# Patient Record
Sex: MALE | Race: WHITE | Employment: OTHER | URBAN - METROPOLITAN AREA
[De-identification: names, ages, dates, MRNs, and addresses within clinical notes are randomized per-mention and may not be internally consistent; named-entity substitution may affect disease eponyms.]

---

## 2017-02-04 ENCOUNTER — APPOINTMENT (OUTPATIENT)
Dept: GENERAL RADIOLOGY | Age: 77
End: 2017-02-04
Attending: EMERGENCY MEDICINE
Payer: MEDICARE

## 2017-02-04 ENCOUNTER — HOSPITAL ENCOUNTER (OUTPATIENT)
Age: 77
Discharge: HOME | End: 2017-02-04
Attending: EMERGENCY MEDICINE
Payer: MEDICARE

## 2017-02-04 VITALS
SYSTOLIC BLOOD PRESSURE: 104 MMHG | HEIGHT: 74 IN | RESPIRATION RATE: 24 BRPM | OXYGEN SATURATION: 98 % | HEART RATE: 84 BPM | BODY MASS INDEX: 28.62 KG/M2 | TEMPERATURE: 98 F | DIASTOLIC BLOOD PRESSURE: 64 MMHG | WEIGHT: 223 LBS

## 2017-02-04 DIAGNOSIS — J40 BRONCHITIS: Primary | ICD-10-CM

## 2017-02-04 PROCEDURE — 99204 OFFICE O/P NEW MOD 45 MIN: CPT

## 2017-02-04 PROCEDURE — 99213 OFFICE O/P EST LOW 20 MIN: CPT

## 2017-02-04 PROCEDURE — 71020 XR CHEST PA + LAT CHEST (CPT=71020): CPT

## 2017-02-04 RX ORDER — AZITHROMYCIN 250 MG/1
TABLET, FILM COATED ORAL
Qty: 1 PACKAGE | Refills: 0 | Status: SHIPPED | OUTPATIENT
Start: 2017-02-04 | End: 2018-11-30 | Stop reason: ALTCHOICE

## 2017-02-04 NOTE — ED PROVIDER NOTES
Patient presents with:  Dyspnea GUICHO SOB (respiratory)      HPI:     Shai Ortiz is a 68year old male who presents for evaluation of a chief complaint of a cough. Onset of symptoms was 3 days ago.  The patient also complains of fever shortness of br flu like symptoms Pt c/o flu like symptoms. Pt completed  antibiotics for dental infection.      Order Specific Question: What is the Relevant Clinical Indication / Reason for Exam?  Answer: flu like symptoms  azithromycin (ZITHROMAX Z-KOKI) 250 MG Oral Tab

## 2017-12-12 PROBLEM — M17.11 PRIMARY OSTEOARTHRITIS OF RIGHT KNEE: Status: ACTIVE | Noted: 2017-12-12

## 2018-01-30 ENCOUNTER — HOSPITAL (OUTPATIENT)
Dept: OTHER | Age: 78
End: 2018-01-30
Attending: HOSPITALIST

## 2018-01-30 ENCOUNTER — DIAGNOSTIC TRANS (OUTPATIENT)
Dept: OTHER | Age: 78
End: 2018-01-30

## 2018-01-30 LAB
ANALYZER ANC (IANC): NORMAL
ANION GAP SERPL CALC-SCNC: 12 MMOL/L (ref 10–20)
BASOPHILS # BLD: 0 THOUSAND/MCL (ref 0–0.3)
BASOPHILS NFR BLD: 0 %
BUN SERPL-MCNC: 27 MG/DL (ref 6–20)
BUN/CREAT SERPL: 18 (ref 7–25)
CALCIUM SERPL-MCNC: 9.4 MG/DL (ref 8.4–10.2)
CHLORIDE: 104 MMOL/L (ref 98–107)
CO2 SERPL-SCNC: 28 MMOL/L (ref 21–32)
CREAT SERPL-MCNC: 1.52 MG/DL (ref 0.67–1.17)
D DIMER PPP FEU-MCNC: 0.52 MG/L FEU
DIFFERENTIAL METHOD BLD: NORMAL
EOSINOPHIL # BLD: 0.1 THOUSAND/MCL (ref 0.1–0.5)
EOSINOPHIL NFR BLD: 3 %
ERYTHROCYTE [DISTWIDTH] IN BLOOD: 13.3 % (ref 11–15)
GLUCOSE SERPL-MCNC: 108 MG/DL (ref 65–99)
HEMATOCRIT: 41.1 % (ref 39–51)
HGB BLD-MCNC: 14.2 GM/DL (ref 13–17)
LYMPHOCYTES # BLD: 1.2 THOUSAND/MCL (ref 1–4)
LYMPHOCYTES NFR BLD: 22 %
MCH RBC QN AUTO: 31.5 PG (ref 26–34)
MCHC RBC AUTO-ENTMCNC: 34.5 GM/DL (ref 32–36.5)
MCV RBC AUTO: 91.1 FL (ref 78–100)
MONOCYTES # BLD: 0.5 THOUSAND/MCL (ref 0.3–0.9)
MONOCYTES NFR BLD: 10 %
NEUTROPHILS # BLD: 3.4 THOUSAND/MCL (ref 1.8–7.7)
NEUTROPHILS NFR BLD: 65 %
NEUTS SEG NFR BLD: NORMAL %
PERCENT NRBC: NORMAL
PLATELET # BLD: 204 THOUSAND/MCL (ref 140–450)
POTASSIUM SERPL-SCNC: 3.5 MMOL/L (ref 3.4–5.1)
RBC # BLD: 4.51 MILLION/MCL (ref 4.5–5.9)
SODIUM SERPL-SCNC: 140 MMOL/L (ref 135–145)
TROPONIN I SERPL HS-MCNC: <0.02 NG/ML
TROPONIN I SERPL HS-MCNC: <0.02 NG/ML
WBC # BLD: 5.3 THOUSAND/MCL (ref 4.2–11)

## 2018-01-31 LAB
ANION GAP SERPL CALC-SCNC: 10 MMOL/L (ref 10–20)
BUN SERPL-MCNC: 26 MG/DL (ref 6–20)
BUN/CREAT SERPL: 17 (ref 7–25)
CALCIUM SERPL-MCNC: 9 MG/DL (ref 8.4–10.2)
CHLORIDE: 105 MMOL/L (ref 98–107)
CO2 SERPL-SCNC: 28 MMOL/L (ref 21–32)
CREAT SERPL-MCNC: 1.51 MG/DL (ref 0.67–1.17)
GLUCOSE SERPL-MCNC: 102 MG/DL (ref 65–99)
MAGNESIUM SERPL-MCNC: 2.1 MG/DL (ref 1.7–2.4)
POTASSIUM SERPL-SCNC: 4.1 MMOL/L (ref 3.4–5.1)
SODIUM SERPL-SCNC: 139 MMOL/L (ref 135–145)
TROPONIN I SERPL HS-MCNC: <0.02 NG/ML

## 2018-07-11 DIAGNOSIS — N20.9 URINARY TRACT STONES: ICD-10-CM

## 2018-07-11 DIAGNOSIS — E78.5 DYSLIPIDEMIA: ICD-10-CM

## 2018-07-11 DIAGNOSIS — D49.7 PITUITARY TUMOR: ICD-10-CM

## 2018-07-11 DIAGNOSIS — C61 PROSTATE CARCINOMA (HCC): ICD-10-CM

## 2018-07-11 DIAGNOSIS — I10 SYSTEMIC PRIMARY ARTERIAL HYPERTENSION: ICD-10-CM

## 2018-07-11 PROBLEM — I25.10 ATHEROSCLEROTIC CORONARY VASCULAR DISEASE: Status: ACTIVE | Noted: 2018-07-11

## 2018-07-11 PROBLEM — E03.9 HYPOTHYROIDISM: Status: ACTIVE | Noted: 2018-07-11

## 2018-07-11 PROBLEM — F41.9 ANXIETY DISORDER: Status: ACTIVE | Noted: 2018-07-11

## 2018-07-11 RX ORDER — M-VIT,TX,IRON,MINS/CALC/FOLIC 27MG-0.4MG
1 TABLET ORAL DAILY
COMMUNITY

## 2018-07-11 RX ORDER — FLUTICASONE FUROATE AND VILANTEROL 200; 25 UG/1; UG/1
1 POWDER RESPIRATORY (INHALATION) DAILY
COMMUNITY

## 2018-07-11 RX ORDER — LEVOTHYROXINE SODIUM 0.05 MG/1
88 TABLET ORAL
COMMUNITY
End: 2020-11-17 | Stop reason: DRUGHIGH

## 2018-07-11 RX ORDER — OMEPRAZOLE 20 MG/1
20 CAPSULE, DELAYED RELEASE ORAL DAILY
COMMUNITY

## 2018-07-11 RX ORDER — SIMVASTATIN 40 MG
40 TABLET ORAL NIGHTLY
COMMUNITY
End: 2018-12-11 | Stop reason: SDUPTHER

## 2018-07-11 RX ORDER — INDAPAMIDE 2.5 MG/1
2.5 TABLET, FILM COATED ORAL DAILY
COMMUNITY
End: 2018-07-19 | Stop reason: DRUGHIGH

## 2018-07-11 RX ORDER — LISINOPRIL 10 MG/1
10 TABLET ORAL DAILY
COMMUNITY
End: 2018-12-11 | Stop reason: SDUPTHER

## 2018-07-19 ENCOUNTER — OFFICE VISIT (OUTPATIENT)
Dept: CARDIOLOGY | Age: 78
End: 2018-07-19
Payer: MEDICARE

## 2018-07-19 VITALS
BODY MASS INDEX: 29.39 KG/M2 | DIASTOLIC BLOOD PRESSURE: 70 MMHG | HEIGHT: 74 IN | WEIGHT: 229 LBS | HEART RATE: 75 BPM | SYSTOLIC BLOOD PRESSURE: 110 MMHG

## 2018-07-19 DIAGNOSIS — R91.1 LUNG NODULE: ICD-10-CM

## 2018-07-19 DIAGNOSIS — I49.3 VENTRICULAR ECTOPY: ICD-10-CM

## 2018-07-19 DIAGNOSIS — Z98.890 HX OF CARDIAC CATH: ICD-10-CM

## 2018-07-19 DIAGNOSIS — I47.20 VENTRICULAR TACHYCARDIA: Primary | ICD-10-CM

## 2018-07-19 DIAGNOSIS — Z87.891 HISTORY OF TOBACCO ABUSE: ICD-10-CM

## 2018-07-19 PROCEDURE — 4040F PNEUMOC VAC/ADMIN/RCVD: CPT | Performed by: INTERNAL MEDICINE

## 2018-07-19 PROCEDURE — 1101F PT FALLS ASSESS-DOCD LE1/YR: CPT | Performed by: INTERNAL MEDICINE

## 2018-07-19 PROCEDURE — 99212 OFFICE O/P EST SF 10 MIN: CPT | Performed by: INTERNAL MEDICINE

## 2018-07-19 PROCEDURE — 1036F TOBACCO NON-USER: CPT | Performed by: INTERNAL MEDICINE

## 2018-07-19 PROCEDURE — G8599 NO ASA/ANTIPLAT THER USE RNG: HCPCS | Performed by: INTERNAL MEDICINE

## 2018-07-19 PROCEDURE — G8427 DOCREV CUR MEDS BY ELIG CLIN: HCPCS | Performed by: INTERNAL MEDICINE

## 2018-07-19 PROCEDURE — G8419 CALC BMI OUT NRM PARAM NOF/U: HCPCS | Performed by: INTERNAL MEDICINE

## 2018-07-19 PROCEDURE — 93000 ELECTROCARDIOGRAM COMPLETE: CPT | Performed by: INTERNAL MEDICINE

## 2018-07-19 PROCEDURE — 1123F ACP DISCUSS/DSCN MKR DOCD: CPT | Performed by: INTERNAL MEDICINE

## 2018-07-19 RX ORDER — INDAPAMIDE 1.25 MG/1
1.25 TABLET, FILM COATED ORAL EVERY MORNING
COMMUNITY
End: 2019-03-25 | Stop reason: ALTCHOICE

## 2018-07-19 ASSESSMENT — ENCOUNTER SYMPTOMS
BACK PAIN: 0
CHOKING: 0
NAUSEA: 0
SHORTNESS OF BREATH: 0
ABDOMINAL DISTENTION: 0
WHEEZING: 0
APNEA: 0
BLOOD IN STOOL: 0
CHEST TIGHTNESS: 0
STRIDOR: 0

## 2018-07-19 NOTE — LETTER
Dear Natalie Pugh MD,    Thank you for allowing me to participate in the care of Mr. Daniele Bridges. He presents today at the [address] [place] [address] in the [facility]. Mr. Jasen Vera returns today for the results of his Holter. It revealed dense ventricular ectopy (7.7% ventricular ectopy with 23 three-beat runs). He is asymptomatic as best I can discern. Prior 2010 cath - normal LVF and normal coronaries. No chest pain. Patient Active Problem List   Diagnosis    Dyslipidemia    Systemic primary arterial hypertension    Urinary tract stones    Anxiety disorder    Atherosclerotic coronary vascular disease    Prostate carcinoma ([facility] [address]    Pituitary tumor    Hypothyroidism    Hx of cardiac cath    History of tobacco abuse    Ventricular ectopy    Lung nodule         The significance of this sort of ventricular ectopy is for all practical purposes defined by the heart from which they derive. If his ventricular function has remained normal will likely make no changes in his regimen. Will obtain a resting ECHO.        Sincerely yours,    Sheilda Lombard, MD  [facility]

## 2018-07-19 NOTE — PATIENT INSTRUCTIONS
[place] at the [address] [place] [address] [address]    Date/Time:  August 22 arrive a few minutes before 10:00 A. M    Patient's contact number:  [phone] (home)     Echocardiogram -  No prep. Takes approximately 30 min. An echocardiogram uses sound waves to produce images of your heart. This commonly used test allows your doctor to see how your heart is beating and pumping blood. Your doctor can use the images from an echocardiogram to identify various abnormalities in the heart muscle and valves. This test has 2 parts:   Ø You will be asked to disrobe from the waist up and given a gown to wear. The technologist will then hook up an EKG monitor to you for the entire exam.   Ø You will then have an ultrasound of your heart (echocardiogram) to assess the heart muscle, heart valves and heart function. You may eat and take any medicines before the exam.     If you need to change your appointment, please call outpatient scheduling at [phone].

## 2018-07-19 NOTE — PROGRESS NOTES
, Rfl:     Multiple Vitamins-Minerals (THERAPEUTIC MULTIVITAMIN-MINERALS) tablet, Take 1 tablet by mouth daily, Disp: , Rfl:     Fluticasone Furoate-Vilanterol (BREO ELLIPTA) 200-25 MCG/INH AEPB, Inhale 1 puff into the lungs daily, Disp: , Rfl:     Review of Systems   Constitutional: Positive for fatigue. Negative for activity change, diaphoresis and unexpected weight change. Some difficulty with fatigue with this hot weather     HENT: Negative for congestion, dental problem, hearing loss and tinnitus. Eyes: Negative for visual disturbance. Respiratory: Negative for apnea, choking, chest tightness, shortness of breath, wheezing and stridor. Remote tobacco abuse  Lung nodule that has remained stable   Cardiovascular: Negative for chest pain, palpitations and leg swelling. Hypertension  2004 cath - mild plaquing described  2010 cath - normal LVF and no CAD  Holter - 7.7% ventricular ectopy with 23 3 beat runs (asymptomatic)   Gastrointestinal: Negative for abdominal distention, blood in stool and nausea. Endocrine: Negative for cold intolerance, heat intolerance, polydipsia and polyuria. Hypothyroidism  Pituitary Tumor - s/p surgery/radiation   Genitourinary: Negative for decreased urine volume and difficulty urinating. S/P seed implantation for prostate cancer  Nephrocalcinosis and ureteral stone   Musculoskeletal: Negative for arthralgias, back pain, gait problem and myalgias. Skin: Negative for pallor and rash. Allergic/Immunologic: Negative for environmental allergies, food allergies and immunocompromised state. Neurological: Negative for dizziness, tremors, seizures, syncope, speech difficulty, weakness, light-headedness, numbness and headaches. Pituitary tumor - prior surgery and radiation   Psychiatric/Behavioral: Negative for agitation, confusion, dysphoric mood and sleep disturbance. The patient is not nervous/anxious.         /70   Pulse 75 Ht 6' 2\" (1.88 m)   Wt 229 lb (103.9 kg)   BMI 29.40 kg/m²   Physical Exam   Constitutional: He is oriented to person, place, and time. He appears well-developed and well-nourished. No distress. HENT:   Head: Normocephalic. Normal male balding pattern   Eyes: Conjunctivae and EOM are normal. Pupils are equal, round, and reactive to light. Neck: No JVD present. Carotid bruit is not present. No thyromegaly present. Cardiovascular: Normal rate, regular rhythm, normal heart sounds, intact distal pulses and normal pulses. PMI is not displaced. Exam reveals no gallop and no friction rub. No murmur heard. Pulmonary/Chest: No respiratory distress. He has no wheezes. He has no rales. He exhibits no tenderness. Abdominal: Soft. Bowel sounds are normal. He exhibits no distension and no mass. There is no tenderness. There is no rebound and no guarding. Musculoskeletal: He exhibits no deformity. Neurological: He is alert and oriented to person, place, and time. Skin: Skin is warm. No rash noted. He is not diaphoretic. No erythema. No pallor. Psychiatric: He has a normal mood and affect. His behavior is normal. Judgment and thought content normal.   Vitals reviewed. Orders Placed This Encounter   Procedures    EKG 12 lead     Order Specific Question:   Reason for Exam?     Answer: Other     No orders of the defined types were placed in this encounter. ECG interpretation from today:   Normal sinus rhythm with low voltage in the limb leads and a 1st degree AVB. Assessment / Plan:  The significance of this sort of ventricular ectopy is for all practical purposes defined by the heart from which they derive. If his ventricular function has remained normal will likely make no changes in his regimen. Will obtain a resting ECHO.        Electronically sign by Willie Ashley MD 7/19/2018 2:46 PM

## 2018-08-13 ENCOUNTER — TELEPHONE (OUTPATIENT)
Dept: CARDIOLOGY | Age: 78
End: 2018-08-13

## 2018-08-13 NOTE — TELEPHONE ENCOUNTER
I left a message for the patient to call me back to reschedule the upcoming appointment with Dr. Ajay Sepulveda. He is pretty well booked until next year and he is okay at this time for his patients to be seen by the nurse practitioners or other available providers.

## 2018-08-22 ENCOUNTER — HOSPITAL ENCOUNTER (OUTPATIENT)
Dept: NON INVASIVE DIAGNOSTICS | Age: 78
Discharge: HOME | End: 2018-08-22
Payer: MEDICARE

## 2018-08-22 ENCOUNTER — OFFICE VISIT (OUTPATIENT)
Dept: CARDIOLOGY | Age: 78
End: 2018-08-22
Payer: MEDICARE

## 2018-08-22 VITALS
SYSTOLIC BLOOD PRESSURE: 112 MMHG | HEIGHT: 74 IN | BODY MASS INDEX: 30.54 KG/M2 | HEART RATE: 61 BPM | WEIGHT: 238 LBS | DIASTOLIC BLOOD PRESSURE: 60 MMHG

## 2018-08-22 DIAGNOSIS — R53.83 OTHER FATIGUE: ICD-10-CM

## 2018-08-22 DIAGNOSIS — I49.3 PVC (PREMATURE VENTRICULAR CONTRACTION): ICD-10-CM

## 2018-08-22 DIAGNOSIS — I49.3 VENTRICULAR ECTOPY: ICD-10-CM

## 2018-08-22 DIAGNOSIS — I47.20 VENTRICULAR TACHYCARDIA: ICD-10-CM

## 2018-08-22 DIAGNOSIS — R06.09 DOE (DYSPNEA ON EXERTION): Primary | ICD-10-CM

## 2018-08-22 DIAGNOSIS — Z87.891 HISTORY OF TOBACCO ABUSE: ICD-10-CM

## 2018-08-22 DIAGNOSIS — Z98.890 HX OF CARDIAC CATH: ICD-10-CM

## 2018-08-22 DIAGNOSIS — I10 SYSTEMIC PRIMARY ARTERIAL HYPERTENSION: ICD-10-CM

## 2018-08-22 LAB
LV EF: 58 %
LVEF MODALITY: NORMAL

## 2018-08-22 PROCEDURE — 99213 OFFICE O/P EST LOW 20 MIN: CPT | Performed by: NURSE PRACTITIONER

## 2018-08-22 PROCEDURE — 1036F TOBACCO NON-USER: CPT | Performed by: NURSE PRACTITIONER

## 2018-08-22 PROCEDURE — 1101F PT FALLS ASSESS-DOCD LE1/YR: CPT | Performed by: NURSE PRACTITIONER

## 2018-08-22 PROCEDURE — G8599 NO ASA/ANTIPLAT THER USE RNG: HCPCS | Performed by: NURSE PRACTITIONER

## 2018-08-22 PROCEDURE — 93000 ELECTROCARDIOGRAM COMPLETE: CPT | Performed by: NURSE PRACTITIONER

## 2018-08-22 PROCEDURE — 93306 TTE W/DOPPLER COMPLETE: CPT

## 2018-08-22 PROCEDURE — 4040F PNEUMOC VAC/ADMIN/RCVD: CPT | Performed by: NURSE PRACTITIONER

## 2018-08-22 PROCEDURE — 1123F ACP DISCUSS/DSCN MKR DOCD: CPT | Performed by: NURSE PRACTITIONER

## 2018-08-22 PROCEDURE — G8417 CALC BMI ABV UP PARAM F/U: HCPCS | Performed by: NURSE PRACTITIONER

## 2018-08-22 PROCEDURE — G8427 DOCREV CUR MEDS BY ELIG CLIN: HCPCS | Performed by: NURSE PRACTITIONER

## 2018-08-22 NOTE — PATIENT INSTRUCTIONS
Patient Education        Stress Echocardiogram: About This Test  What is it? An echocardiogram (also called an echo) uses sound waves to make an image of your heart. A device called a transducer is moved across your chest. It looks like a microphone. The transducer sends sound waves that echo off your heart and back to the transducer. These echoes are turned into moving pictures of your heart that can be seen on a video screen. In a stress echocardiogram, an echo is done while your heart is at rest and after your heart is made to work hard (stressed). You exercise to make your heart work hard. Sometimes, instead of exercise, a medicine is used that makes your heart respond like you have been exercising. Why is this test done? A stress echocardiogram is usually done to find out if you might have reduced blood flow to your heart. This is known as coronary artery disease. Reduced blood flow is easier to see when your heart is put under some form of stress. How can you prepare for the test?  · Do not smoke or eat a heavy meal before this test.  · Wear flat, comfortable shoes (no bedroom slippers) and loose, lightweight shorts or sweatpants. Walking or running shoes are best.  · Tell your doctor if:  Lm Vaughn You are taking any medicines. ¨ You are taking medicine for an erection problem, such as sildenafil (Viagra), tadalafil (Cialis), and vardenafil (Levitra). You may need to take nitroglycerin during this test, which can cause a serious reaction if you have taken a medicine for an erection problem within the past 48 hours. ¨ You have had bleeding problems, or if you take aspirin or some other blood thinner. ¨ You have joint problems in your hips or legs that may make it hard for you to exercise. ¨ You have a heart valve problem. What happens during the test?  You will first have an echocardiogram before exercising. This is called the baseline.  You then exercise for a specific amount of time and then have another echocardiogram.  · You will take off all or most of your clothes and change into a gown. · You will lie on your back or on your left side on a bed or table. · You may receive medicine through a vein (intravenously, or IV). The IV can be used to give you a contrast material, which helps your doctor get good views of your heart. · Small pads or patches (electrodes) will be taped to your arms and legs to record your heart rate during the test.  · A small amount of gel will be rubbed on the left side of your chest to help  the sound waves. · The transducer will be pressed firmly against your chest and moved slowly back and forth. It is usually moved to different areas on your chest to get specific views of your heart. · You will be asked to do several things, such as hold very still, breathe in and out very slowly, hold your breath, or lie on your left side. This test usually takes 30 to 60 minutes. When the echocardiogram is finished, you will exercise and then have another echocardiogram. If you are not able to exercise, you may be given medicine that stimulates your heart to beat harder and faster, as if you were exercising. You most likely will either walk on a treadmill or pedal a stationary bicycle. While you exercise:  · Your heart rate and blood pressure are recorded. · You might be asked to use numbers to say how hard you are exercising. The higher the number, the harder you think you are exercising. · You will continue to exercise until you or your doctor feels you need to stop. · You will then lie on a bed or table, and another echocardiogram will be done. An exercise stress echocardiogram takes about 30 to 60 minutes. What else should you know about the test?  · No electricity passes through your body during the test. There is no danger of getting an electrical shock. · During the tests, tell your doctor if:  ¨ You have chest pain. ¨ You are very short of breath.   ¨ You are lightheaded. ¨ You have other symptoms. What happens after the test?  · You will be able to sit or lie down and rest.  · Your heart rate and blood pressure will be checked for about 5 to 10 minutes. · You can go back to your usual activities right away. When should you call for help? Call 911 anytime you think you may need emergency care. For example, call if:  · You passed out (lost consciousness). · You have symptoms of a heart attack, such as:  ¨ Chest pain or pressure. ¨ Sweating. ¨ Shortness of breath. ¨ Nausea or vomiting. ¨ Pain that spreads from the chest to the neck, jaw, or one or both shoulders or arms. ¨ Dizziness or lightheadedness. ¨ A fast or uneven pulse. After calling 911, chew 1 adult-strength aspirin. Wait for an ambulance. Do not try to drive yourself. Watch closely for changes in your health, and be sure to contact your doctor if:  · You do not get better as expected. Follow-up care is a key part of your treatment and safety. Be sure to make and go to all appointments, and call your doctor if you are having problems. It's also a good idea to keep a list of the medicines you take. Ask your doctor when you can expect to have your test results. Where can you learn more? Go to https://TipCity.M[employer] InDeed. org and sign in to your Quu account. Enter I172 in the [facility] box to learn more about \"Stress Echocardiogram: About This Test.\"     If you do not have an account, please click on the \"Sign Up Now\" link. Current as of: December 6, 2017  Content Version: 11.7  © 3522-6064 [employer]. Care instructions adapted under license by [facility] ([facility]). If you have questions about a medical condition or this instruction, always ask your healthcare professional. [address] any warranty or liability for your use of this information.      Hypertension  (High Blood Pressure)  Most people with high blood pressure (hypertension) have no doctor as told. GET HELP RIGHT AWAY IF:  · You get a very bad headache. · You get blurred or changing vision. · You feel confused. · You feel weak, numb, or faint. · You get chest or belly (abdominal) pain. · You throw up (vomit). · You cannot breathe very well. If you have a blood pressure reading with a top number of 180 or higher, you need to see your doctor right away. This is especially true if you are having any of the problems listed above. Hyperlipidemia   (Dyslipidemia; High Triglycerides; Triglycerides, High)     Definition   Hyperlipidemia is a high level of fats in the blood. These fats, called lipids, include cholesterol and triglycerides. There are five types of hyperlipidemia. The type depends on which lipid in the blood is high. Causes   Causes may include:   A family history of hyperlipidemia   A diet high in total fat, saturated fat, or cholesterol   Obesity   Excess alcohol intake   Certain conditions, including:   Diabetes   Low thyroid   Kidney problems   Liver disease   Cushing's syndrome   Certain drugs, such as:   Hormones or birth control pills   Beta-blockers   Some diuretics   Cortisone drugs   Isotretinoin (for acne )   Some anti- HIV drugs   Risk Factors   These factors increase your chance of developing this condition. Tell your doctor if you have any of these:   Advancing age   Sex: male   Postmenopause   Lack of exercise   Smoking   Stress   Overuse of alcohol   Symptoms   Hyperlipidemia usually does not cause symptoms. Very high levels of lipids or triglycerides can cause:   Fat deposits in the skin or tendons ( xanthomas )   Pain, enlargement, or swelling of organs such as the liver, spleen, or pancreas ( pancreatitis )   Obstruction of blood vessels in heart and brain   Hyperlipidemia can increase your risk of atherosclerosis . This is a dangerous hardening of the arteries. It can end up blocking blood flow.  In some cases, this may result in:   Angina changes. Prevention   To help reduce your chance of getting hyperlipidemia, take the following steps:   Starting at age 21, get cholesterol tests. Eat a diet low in total fat, saturated fat, and cholesterol. If you smoke, quit. Drink alcohol in moderation (two drinks per day for men, one drink per day for women). If you are overweight, lose weight. Exercise regularly. Talk with your doctor first.   If you have diabetes , control your blood sugar. Talk to your doctor about medications you are taking. They may have side effects that cause hyperlipidemia. Last Reviewed: September 2010 Skip Bonilla DO   Updated: 11/9/2010     [place] at the [facility] and [address] [facility] located on the first floor of [place] [address] through hospital main entrance and turn immediately to your left. Patient contact number:  [phone] (home)      Date/Arrival Time:  Sep 6th arrive 9:15 for 9:45A. M    Stress Echocardiogram      This records the heart's activity during a cardiac stress test.  A stress echocardiogram is a very effective, noninvasive test that can help determine whether you have blockages in your coronary arteries. The exam takes approximately thirty minutes. To help ensure accurate results, patients should take the following steps in preparation for a stress echocardiogram:   Refrain from strenuous activity for 12 hours before the test.   Do not eat, drink, or smoke for two hours prior to the test.  Unless instructed otherwise by your physician, you should continue to take prescribed medications. Wear loose, comfortable clothing and walking shoes. If you need to change this appointment, please call [phone] to reschedule.

## 2018-08-23 NOTE — PROGRESS NOTES
EXCISION         Family History   Problem Relation Age of Onset    Hypertension Mother        Social History     Social History    Marital status:      Spouse name: N/A    Number of children: N/A    Years of education: N/A     Occupational History    Not on file. Social History Main Topics    Smoking status: Former Smoker    Smokeless tobacco: Never Used      Comment: Quit over 50 years ago    Alcohol use Yes    Drug use: No    Sexual activity: Not on file     Other Topics Concern    Not on file     Social History Narrative    No narrative on file       No Known Allergies      Current Outpatient Prescriptions:     indapamide (LOZOL) 1.25 MG tablet, Take 1.25 mg by mouth every morning, Disp: , Rfl:     sertraline (ZOLOFT) 50 MG tablet, Take 50 mg by mouth daily, Disp: , Rfl:     simvastatin (ZOCOR) 40 MG tablet, Take 40 mg by mouth nightly, Disp: , Rfl:     omeprazole (PRILOSEC) 20 MG delayed release capsule, Take 20 mg by mouth daily , Disp: , Rfl:     lisinopril (PRINIVIL;ZESTRIL) 10 MG tablet, Take 10 mg by mouth daily, Disp: , Rfl:     levothyroxine (SYNTHROID) 50 MCG tablet, Take 50 mcg by mouth Daily, Disp: , Rfl:     Multiple Vitamins-Minerals (THERAPEUTIC MULTIVITAMIN-MINERALS) tablet, Take 1 tablet by mouth daily, Disp: , Rfl:     Fluticasone Furoate-Vilanterol (BREO ELLIPTA) 200-25 MCG/INH AEPB, Inhale 1 puff into the lungs daily, Disp: , Rfl:     PE:  Vitals:    08/22/18 1059   BP: 112/60   Pulse: 61       Estimated body mass index is 30.56 kg/m² as calculated from the following:    Height as of this encounter: 6' 2\" (1.88 m). Weight as of this encounter: 238 lb (108 kg). Constitutional: He is oriented to person, place, and time. He appears well-developed and well-nourished in no acute distress. Head: Normocephalic and atraumatic. Neck:  Neck supple without JVD present. Cardiovascular: Normal rate, Normal rhythm, normal heart sounds. No murmur ascultated.   No gallop and no friction rub. No carotid bruits. No peripheral edema. Pulmonary/Chest:  Lungs clear to auscultation bilaterally without evidence of respiratory distress. He without wheezes. He without rales or ronchi. Musculoskeletal: Normal range of motion. Gait is normal.   Neurological: He is alert and oriented to person, place, and time. Skin: Skin is warm and dry without rash or pallor. Psychiatric: He has a normal mood and affect. His behavior is normal. Thought content normal.     No results found for: CREATININE, HGB, PROBNP    ECG 08/23/18  Sinus Rhythm, First degree AV block, Occasional PAC, HR 60    TTE- 8/22/18  Summary   Normal left ventricular size and function EF 55-60%. Mild left ventricular   hypertrophy with normal diastolic function evidenced by E/A ratio. Left   atrium is normal size. Aortic valve probably tricuspid (poorly visualized)   - functions normally. Mitral valve appears structurally normal with trace   MR. Right sided chambers appear normal size and RV systolic function is   preserved. There is no pericardial effusion and the aortic root dimensions   are within normal limits. Assessment, Recommendations, & Plan:  66 y.o. male with HTN, HLD, PVC's, & RAMIRES    HTN- Controlled, No changes    HLD- On Zocor, No changes    PVC's- Echo was essentialyl normal. I spoke with Dr. Sissy Levi and he would like to get a Stress echo to rule out any ischemia. . We will call him with results. RAMIRES- Stress echo      Disposition - RTC in 6 months with Dr. Sissy Levi or sooner if needed      Please do not hesitate to contact me for any questions or concerns.     Sincerely yours,    SADI Oliveira

## 2018-09-06 ENCOUNTER — HOSPITAL ENCOUNTER (OUTPATIENT)
Dept: NON INVASIVE DIAGNOSTICS | Age: 78
Discharge: HOME | End: 2018-09-06
Payer: MEDICARE

## 2018-09-06 DIAGNOSIS — R06.09 DOE (DYSPNEA ON EXERTION): ICD-10-CM

## 2018-09-06 DIAGNOSIS — I49.3 PVC (PREMATURE VENTRICULAR CONTRACTION): ICD-10-CM

## 2018-09-06 DIAGNOSIS — R53.83 OTHER FATIGUE: ICD-10-CM

## 2018-09-06 LAB
LV EF: 50 %
LVEF MODALITY: NORMAL

## 2018-09-06 PROCEDURE — 6360000004 HC RX CONTRAST MEDICATION: Performed by: INTERNAL MEDICINE

## 2018-09-06 PROCEDURE — C8928 TTE W OR W/O FOL W/CON,STRES: HCPCS

## 2018-09-06 PROCEDURE — 2580000003 HC RX 258: Performed by: INTERNAL MEDICINE

## 2018-09-06 RX ORDER — SODIUM CHLORIDE 0.9 % (FLUSH) 0.9 %
10 SYRINGE (ML) INJECTION PRN
Status: ACTIVE | OUTPATIENT
Start: 2018-09-06 | End: 2018-09-07

## 2018-09-06 RX ADMIN — Medication 10 ML: at 10:55

## 2018-09-06 RX ADMIN — PERFLUTREN 2.2 MG: 6.52 INJECTION, SUSPENSION INTRAVENOUS at 10:58

## 2018-11-07 ENCOUNTER — APPOINTMENT (OUTPATIENT)
Dept: GENERAL RADIOLOGY | Age: 78
End: 2018-11-07
Payer: MEDICARE

## 2018-11-07 ENCOUNTER — APPOINTMENT (OUTPATIENT)
Dept: CT IMAGING | Age: 78
End: 2018-11-07
Payer: MEDICARE

## 2018-11-07 ENCOUNTER — HOSPITAL ENCOUNTER (EMERGENCY)
Age: 78
Discharge: HOME | End: 2018-11-07
Attending: EMERGENCY MEDICINE
Payer: MEDICARE

## 2018-11-07 VITALS
DIASTOLIC BLOOD PRESSURE: 71 MMHG | BODY MASS INDEX: 27.72 KG/M2 | RESPIRATION RATE: 18 BRPM | OXYGEN SATURATION: 93 % | HEIGHT: 74 IN | TEMPERATURE: 98.2 F | HEART RATE: 61 BPM | WEIGHT: 216 LBS | SYSTOLIC BLOOD PRESSURE: 124 MMHG

## 2018-11-07 DIAGNOSIS — R42 DIZZINESS: Primary | ICD-10-CM

## 2018-11-07 DIAGNOSIS — R53.1 GENERAL WEAKNESS: ICD-10-CM

## 2018-11-07 DIAGNOSIS — R06.02 SHORTNESS OF BREATH: ICD-10-CM

## 2018-11-07 DIAGNOSIS — M25.50 ARTHRALGIA, UNSPECIFIED JOINT: ICD-10-CM

## 2018-11-07 DIAGNOSIS — R53.81 MALAISE AND FATIGUE: ICD-10-CM

## 2018-11-07 DIAGNOSIS — R53.83 MALAISE AND FATIGUE: ICD-10-CM

## 2018-11-07 LAB
ALBUMIN SERPL-MCNC: 4 G/DL (ref 3.5–5.2)
ALP BLD-CCNC: 47 U/L (ref 40–130)
ALT SERPL-CCNC: 17 U/L (ref 5–41)
ANION GAP SERPL CALCULATED.3IONS-SCNC: 14 MMOL/L (ref 7–19)
APTT: 35.6 SEC (ref 26–36.2)
AST SERPL-CCNC: 24 U/L (ref 5–40)
BASE EXCESS ARTERIAL: 2.8 MMOL/L (ref -2–2)
BASOPHILS ABSOLUTE: 0.1 K/UL (ref 0–0.2)
BASOPHILS RELATIVE PERCENT: 0.8 % (ref 0–1)
BILIRUB SERPL-MCNC: 0.5 MG/DL (ref 0.2–1.2)
BILIRUBIN URINE: NEGATIVE
BLOOD, URINE: NEGATIVE
BUN BLDV-MCNC: 20 MG/DL (ref 8–23)
CALCIUM SERPL-MCNC: 9.3 MG/DL (ref 8.8–10.2)
CARBOXYHEMOGLOBIN ARTERIAL: 2.1 % (ref 0–5)
CHLORIDE BLD-SCNC: 98 MMOL/L (ref 98–111)
CLARITY: CLEAR
CO2: 25 MMOL/L (ref 22–29)
COLOR: YELLOW
CREAT SERPL-MCNC: 1.6 MG/DL (ref 0.5–1.2)
D DIMER: 0.57 UG/ML FEU (ref 0–0.48)
EOSINOPHILS ABSOLUTE: 0.1 K/UL (ref 0–0.6)
EOSINOPHILS RELATIVE PERCENT: 1.8 % (ref 0–5)
GFR NON-AFRICAN AMERICAN: 42
GLUCOSE BLD-MCNC: 100 MG/DL (ref 74–109)
GLUCOSE URINE: NEGATIVE MG/DL
HCO3 ARTERIAL: 23.4 MMOL/L (ref 22–26)
HCT VFR BLD CALC: 38 % (ref 42–52)
HEMOGLOBIN, ART, EXTENDED: 13.2 G/DL (ref 14–18)
HEMOGLOBIN: 12.8 G/DL (ref 14–18)
INR BLD: 1.1 (ref 0.88–1.18)
KETONES, URINE: NEGATIVE MG/DL
LEUKOCYTE ESTERASE, URINE: NEGATIVE
LIPASE: 39 U/L (ref 13–60)
LYMPHOCYTES ABSOLUTE: 1.5 K/UL (ref 1.1–4.5)
LYMPHOCYTES RELATIVE PERCENT: 24.8 % (ref 20–40)
MAGNESIUM: 2.1 MG/DL (ref 1.6–2.4)
MCH RBC QN AUTO: 31.1 PG (ref 27–31)
MCHC RBC AUTO-ENTMCNC: 33.7 G/DL (ref 33–37)
MCV RBC AUTO: 92.5 FL (ref 80–94)
METHEMOGLOBIN ARTERIAL: 1 %
MONOCYTES ABSOLUTE: 0.6 K/UL (ref 0–0.9)
MONOCYTES RELATIVE PERCENT: 9.9 % (ref 0–10)
NEUTROPHILS ABSOLUTE: 3.8 K/UL (ref 1.5–7.5)
NEUTROPHILS RELATIVE PERCENT: 62.5 % (ref 50–65)
NITRITE, URINE: NEGATIVE
O2 CONTENT ARTERIAL: 17.6 ML/DL
O2 SAT, ARTERIAL: 94.8 %
O2 THERAPY: ABNORMAL
PCO2 ARTERIAL: 25 MMHG (ref 35–45)
PDW BLD-RTO: 13.2 % (ref 11.5–14.5)
PH ARTERIAL: 7.58 (ref 7.35–7.45)
PH UA: 6
PLATELET # BLD: 215 K/UL (ref 130–400)
PMV BLD AUTO: 9.1 FL (ref 9.4–12.4)
PO2 ARTERIAL: 72 MMHG (ref 80–100)
POTASSIUM REFLEX MAGNESIUM: 3.3 MMOL/L (ref 3.5–5)
POTASSIUM, WHOLE BLOOD: 3.2
PROTEIN UA: NEGATIVE MG/DL
PROTHROMBIN TIME: 14.1 SEC (ref 12–14.6)
RBC # BLD: 4.11 M/UL (ref 4.7–6.1)
SODIUM BLD-SCNC: 137 MMOL/L (ref 136–145)
SPECIFIC GRAVITY UA: 1.02
TOTAL PROTEIN: 6.4 G/DL (ref 6.6–8.7)
TROPONIN: <0.01 NG/ML (ref 0–0.03)
TSH REFLEX FT4: 1.23 UIU/ML (ref 0.35–5.5)
URINE REFLEX TO CULTURE: NORMAL
UROBILINOGEN, URINE: 0.2 E.U./DL
WBC # BLD: 6.1 K/UL (ref 4.8–10.8)

## 2018-11-07 PROCEDURE — 85379 FIBRIN DEGRADATION QUANT: CPT

## 2018-11-07 PROCEDURE — 81003 URINALYSIS AUTO W/O SCOPE: CPT

## 2018-11-07 PROCEDURE — 84443 ASSAY THYROID STIM HORMONE: CPT

## 2018-11-07 PROCEDURE — 99285 EMERGENCY DEPT VISIT HI MDM: CPT

## 2018-11-07 PROCEDURE — 6360000004 HC RX CONTRAST MEDICATION: Performed by: EMERGENCY MEDICINE

## 2018-11-07 PROCEDURE — 93005 ELECTROCARDIOGRAM TRACING: CPT

## 2018-11-07 PROCEDURE — 71045 X-RAY EXAM CHEST 1 VIEW: CPT

## 2018-11-07 PROCEDURE — 83690 ASSAY OF LIPASE: CPT

## 2018-11-07 PROCEDURE — 36415 COLL VENOUS BLD VENIPUNCTURE: CPT

## 2018-11-07 PROCEDURE — 84484 ASSAY OF TROPONIN QUANT: CPT

## 2018-11-07 PROCEDURE — 2580000003 HC RX 258: Performed by: EMERGENCY MEDICINE

## 2018-11-07 PROCEDURE — 96365 THER/PROPH/DIAG IV INF INIT: CPT

## 2018-11-07 PROCEDURE — 6360000002 HC RX W HCPCS: Performed by: EMERGENCY MEDICINE

## 2018-11-07 PROCEDURE — 71275 CT ANGIOGRAPHY CHEST: CPT

## 2018-11-07 PROCEDURE — 85730 THROMBOPLASTIN TIME PARTIAL: CPT

## 2018-11-07 PROCEDURE — 99284 EMERGENCY DEPT VISIT MOD MDM: CPT | Performed by: EMERGENCY MEDICINE

## 2018-11-07 PROCEDURE — 83735 ASSAY OF MAGNESIUM: CPT

## 2018-11-07 PROCEDURE — 82803 BLOOD GASES ANY COMBINATION: CPT

## 2018-11-07 PROCEDURE — 36600 WITHDRAWAL OF ARTERIAL BLOOD: CPT

## 2018-11-07 PROCEDURE — 85610 PROTHROMBIN TIME: CPT

## 2018-11-07 PROCEDURE — 80053 COMPREHEN METABOLIC PANEL: CPT

## 2018-11-07 PROCEDURE — 96375 TX/PRO/DX INJ NEW DRUG ADDON: CPT

## 2018-11-07 PROCEDURE — 85025 COMPLETE CBC W/AUTO DIFF WBC: CPT

## 2018-11-07 PROCEDURE — 84132 ASSAY OF SERUM POTASSIUM: CPT

## 2018-11-07 RX ORDER — 0.9 % SODIUM CHLORIDE 0.9 %
1000 INTRAVENOUS SOLUTION INTRAVENOUS ONCE
Status: COMPLETED | OUTPATIENT
Start: 2018-11-07 | End: 2018-11-07

## 2018-11-07 RX ORDER — MAGNESIUM SULFATE IN WATER 40 MG/ML
2 INJECTION, SOLUTION INTRAVENOUS ONCE
Status: COMPLETED | OUTPATIENT
Start: 2018-11-07 | End: 2018-11-07

## 2018-11-07 RX ORDER — KETOROLAC TROMETHAMINE 30 MG/ML
15 INJECTION, SOLUTION INTRAMUSCULAR; INTRAVENOUS ONCE
Status: COMPLETED | OUTPATIENT
Start: 2018-11-07 | End: 2018-11-07

## 2018-11-07 RX ADMIN — MAGNESIUM SULFATE HEPTAHYDRATE 2 G: 40 INJECTION, SOLUTION INTRAVENOUS at 20:02

## 2018-11-07 RX ADMIN — SODIUM CHLORIDE 1000 ML: 9 INJECTION, SOLUTION INTRAVENOUS at 20:58

## 2018-11-07 RX ADMIN — SODIUM CHLORIDE 1000 ML: 9 INJECTION, SOLUTION INTRAVENOUS at 19:08

## 2018-11-07 RX ADMIN — IOPAMIDOL 90 ML: 755 INJECTION, SOLUTION INTRAVENOUS at 20:32

## 2018-11-07 RX ADMIN — KETOROLAC TROMETHAMINE 15 MG: 30 INJECTION, SOLUTION INTRAMUSCULAR at 19:08

## 2018-11-07 ASSESSMENT — ENCOUNTER SYMPTOMS
BLOOD IN STOOL: 0
CONSTIPATION: 0
CHEST TIGHTNESS: 0
SORE THROAT: 0
NAUSEA: 0
COUGH: 0
ABDOMINAL PAIN: 0
TROUBLE SWALLOWING: 0
DIARRHEA: 0
ABDOMINAL DISTENTION: 0
SHORTNESS OF BREATH: 1
VOMITING: 0
RHINORRHEA: 0
BACK PAIN: 0

## 2018-11-07 ASSESSMENT — PAIN DESCRIPTION - LOCATION: LOCATION: GENERALIZED

## 2018-11-07 ASSESSMENT — PAIN SCALES - GENERAL
PAINLEVEL_OUTOF10: 6
PAINLEVEL_OUTOF10: 6

## 2018-11-07 ASSESSMENT — PAIN DESCRIPTION - PAIN TYPE: TYPE: ACUTE PAIN;CHRONIC PAIN

## 2018-11-08 LAB
EKG P AXIS: NORMAL DEGREES
EKG P-R INTERVAL: NORMAL MS
EKG Q-T INTERVAL: 442 MS
EKG QRS DURATION: 96 MS
EKG QTC CALCULATION (BAZETT): 449 MS
EKG T AXIS: 44 DEGREES

## 2018-11-08 NOTE — ED PROVIDER NOTES
[facility]  eMERGENCY dEPARTMENT eNCOUnter      Pt Name: Nancy Graves  MRN: [id number]  [place] 1940  Date of evaluation: 11/7/2018  Provider: Martha Segura MD    CHIEF COMPLAINT       Chief Complaint   Patient presents with    Shortness of Breath    Fatigue    Dizziness     with position changes    Other     pt has multiple complaints that are intermittent for months. Pt states \"everyday its something else\"       HISTORY OF PRESENT ILLNESS   (Location/Symptom, Timing/Onset, Context/Setting, Quality, Duration, Modifying Factors, Severity)  Note limiting factors. Nancy Graves is a 66 y.o. male who presents to the emergency department with Multiple complaints. Does have been going on for the last few months minimum. She complains of arthralgias all over his body, dyspnea on exertion, lightheadedness with position change. The dyspnea on exertion does not always happen when he exerts himself but sometimes does. Nothing makes the bodyaches better or worse there constant and, what time. The lightheadedness seems to be more with position change, but is not always present with position changes well. Patient has seen his primary care physician, also seen his pulmonologist. He sees his urologist tomorrow. He is here because he wants to know why he is fatigued, having difficult to breathing, having body aches, and poor appetite. No other complaints at this point in time. Again these have been going on for the past few months, prior evaluations by above-mentioned physicians, workup thus far has not found any acute pathology. Patient appears very anxious. Nursing Notes were reviewed. REVIEW OF SYSTEMS    (2-9 systems for level 4,10 or more for level 5)     Review of Systems   Constitutional: Positive for appetite change and fatigue. Negative for activity change, chills and fever. HENT: Negative for congestion, ear pain, nosebleeds, rhinorrhea, sore throat and trouble swallowing. Social History    Marital status:      Spouse name: N/A    Number of children: N/A    Years of education: N/A     Social History Main Topics    Smoking status: Former Smoker    Smokeless tobacco: Never Used      Comment: Quit over 50 years ago    Alcohol use Yes    Drug use: No    Sexual activity: Not on file     Other Topics Concern    Not on file     Social History Narrative    No narrative on file       SCREENINGS    Judy Coma Scale  Eye Opening: Spontaneous  Best Verbal Response: Oriented  Best Motor Response: Obeys commands  Judy Coma Scale Score: 15      PHYSICAL EXAM    (up to 7 for level 4, 8 or more for level 5)     ED Triage Vitals [11/07/18 1823]   BP Temp Temp Source Pulse Resp SpO2 Height Weight   (!) 147/81 97.4 °F (36.3 °C) Oral 80 18 94 % 6' 2\" (1.88 m) 216 lb (98 kg)       Physical Exam   Constitutional: He is oriented to person, place, and time. He appears well-developed and well-nourished. No distress. HENT:   Head: Normocephalic and atraumatic. Mouth/Throat: Oropharynx is clear and moist.   Eyes: Pupils are equal, round, and reactive to light. EOM are normal. No scleral icterus. Neck: Normal range of motion. Neck supple. No tracheal deviation present. Cardiovascular: Normal rate, regular rhythm, normal heart sounds and intact distal pulses. Exam reveals no gallop and no friction rub. No murmur heard. Pulmonary/Chest: Effort normal and breath sounds normal. No respiratory distress. He has no wheezes. He has no rales. He exhibits no tenderness. Abdominal: Soft. He exhibits no distension and no mass. There is no tenderness. There is no rebound and no guarding. Genitourinary: Rectum normal and penis normal. Rectal exam shows guaiac negative stool. Musculoskeletal: Normal range of motion. He exhibits no edema, tenderness or deformity. Neurological: He is alert and oriented to person, place, and time. No cranial nerve deficit.  He exhibits normal muscle

## 2018-11-30 ENCOUNTER — LAB ENCOUNTER (OUTPATIENT)
Dept: LAB | Age: 78
End: 2018-11-30
Attending: FAMILY MEDICINE
Payer: MEDICARE

## 2018-11-30 ENCOUNTER — OFFICE VISIT (OUTPATIENT)
Dept: FAMILY MEDICINE CLINIC | Facility: CLINIC | Age: 78
End: 2018-11-30
Payer: MEDICARE

## 2018-11-30 VITALS
DIASTOLIC BLOOD PRESSURE: 59 MMHG | TEMPERATURE: 98 F | BODY MASS INDEX: 28 KG/M2 | WEIGHT: 216 LBS | HEART RATE: 80 BPM | SYSTOLIC BLOOD PRESSURE: 94 MMHG

## 2018-11-30 DIAGNOSIS — M17.11 PRIMARY OSTEOARTHRITIS OF RIGHT KNEE: ICD-10-CM

## 2018-11-30 DIAGNOSIS — R06.00 DYSPNEA, UNSPECIFIED TYPE: ICD-10-CM

## 2018-11-30 DIAGNOSIS — M25.50 POLYARTHRALGIA: ICD-10-CM

## 2018-11-30 DIAGNOSIS — Z85.46 HISTORY OF PROSTATE CANCER: ICD-10-CM

## 2018-11-30 DIAGNOSIS — F32.2 SEVERE SINGLE EPISODE OF MAJOR DEPRESSIVE DISORDER WITH ANXIETY (HCC): ICD-10-CM

## 2018-11-30 DIAGNOSIS — E78.00 HIGH CHOLESTEROL: ICD-10-CM

## 2018-11-30 DIAGNOSIS — R53.82 CHRONIC FATIGUE: Primary | ICD-10-CM

## 2018-11-30 DIAGNOSIS — F41.8 SEVERE SINGLE EPISODE OF MAJOR DEPRESSIVE DISORDER WITH ANXIETY (HCC): ICD-10-CM

## 2018-11-30 DIAGNOSIS — I10 ESSENTIAL HYPERTENSION: ICD-10-CM

## 2018-11-30 DIAGNOSIS — R53.82 CHRONIC FATIGUE: ICD-10-CM

## 2018-11-30 DIAGNOSIS — M54.2 NECK ARTHRALGIA: ICD-10-CM

## 2018-11-30 DIAGNOSIS — Z87.898 HISTORY OF PITUITARY TUMOR: ICD-10-CM

## 2018-11-30 PROCEDURE — 86039 ANTINUCLEAR ANTIBODIES (ANA): CPT

## 2018-11-30 PROCEDURE — 84402 ASSAY OF FREE TESTOSTERONE: CPT

## 2018-11-30 PROCEDURE — 36415 COLL VENOUS BLD VENIPUNCTURE: CPT

## 2018-11-30 PROCEDURE — 85652 RBC SED RATE AUTOMATED: CPT

## 2018-11-30 PROCEDURE — 99204 OFFICE O/P NEW MOD 45 MIN: CPT | Performed by: FAMILY MEDICINE

## 2018-11-30 PROCEDURE — G0463 HOSPITAL OUTPT CLINIC VISIT: HCPCS | Performed by: FAMILY MEDICINE

## 2018-11-30 PROCEDURE — 86038 ANTINUCLEAR ANTIBODIES: CPT

## 2018-11-30 PROCEDURE — 84443 ASSAY THYROID STIM HORMONE: CPT

## 2018-11-30 PROCEDURE — 84403 ASSAY OF TOTAL TESTOSTERONE: CPT

## 2018-11-30 RX ORDER — INDAPAMIDE 1.25 MG/1
1.25 TABLET, FILM COATED ORAL DAILY
COMMUNITY

## 2018-11-30 RX ORDER — SERTRALINE HYDROCHLORIDE 100 MG/1
1 TABLET, FILM COATED ORAL DAILY
COMMUNITY
Start: 2018-09-01 | End: 2018-12-21

## 2018-11-30 RX ORDER — LEVOTHYROXINE SODIUM 0.05 MG/1
50 TABLET ORAL
COMMUNITY

## 2018-11-30 RX ORDER — OMEPRAZOLE 20 MG/1
1 CAPSULE, DELAYED RELEASE ORAL DAILY
COMMUNITY
Start: 2018-09-04

## 2018-11-30 NOTE — PROGRESS NOTES
Hasn't been feeling himself  Has lower energy  I can't walk without getting short of breath    Has uri now    Hx of depression with anxiety    Had heart tests with stress test at      is 13.2    cta cmp all done at [place]    Chronic fatigue    Patient prostate cancer  Stable. 3. History of pituitary tumor  Get old records    4. High cholesterol  recheck    5. Essential hypertension  stable    6. Dyspnea, unspecified type  stable    7.  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

## 2018-12-04 ENCOUNTER — HOSPITAL ENCOUNTER (OUTPATIENT)
Age: 78
Discharge: HOME | End: 2018-12-04
Attending: EMERGENCY MEDICINE
Payer: MEDICARE

## 2018-12-04 ENCOUNTER — APPOINTMENT (OUTPATIENT)
Dept: GENERAL RADIOLOGY | Age: 78
End: 2018-12-04
Attending: EMERGENCY MEDICINE
Payer: MEDICARE

## 2018-12-04 VITALS
WEIGHT: 211 LBS | DIASTOLIC BLOOD PRESSURE: 63 MMHG | TEMPERATURE: 98 F | HEIGHT: 74 IN | HEART RATE: 73 BPM | RESPIRATION RATE: 24 BRPM | SYSTOLIC BLOOD PRESSURE: 109 MMHG | OXYGEN SATURATION: 97 % | BODY MASS INDEX: 27.08 KG/M2

## 2018-12-04 DIAGNOSIS — J06.9 VIRAL URI WITH COUGH: Primary | ICD-10-CM

## 2018-12-04 PROCEDURE — 99213 OFFICE O/P EST LOW 20 MIN: CPT

## 2018-12-04 PROCEDURE — 99214 OFFICE O/P EST MOD 30 MIN: CPT

## 2018-12-04 PROCEDURE — 71046 X-RAY EXAM CHEST 2 VIEWS: CPT | Performed by: EMERGENCY MEDICINE

## 2018-12-04 RX ORDER — BENZONATATE 200 MG/1
200 CAPSULE ORAL 3 TIMES DAILY PRN
Qty: 15 CAPSULE | Refills: 0 | Status: SHIPPED | OUTPATIENT
Start: 2018-12-04 | End: 2018-12-09

## 2018-12-04 RX ORDER — FLUTICASONE PROPIONATE 50 MCG
1-2 SPRAY, SUSPENSION (ML) NASAL DAILY
Qty: 16 G | Refills: 0 | Status: SHIPPED | OUTPATIENT
Start: 2018-12-04 | End: 2019-01-03

## 2018-12-04 NOTE — ED INITIAL ASSESSMENT (HPI)
PATIENT WITH COUGH SINCE LAST Thursday. STATES COUGH IS DRY. DENIES FEVERS.   REPORTS HX OF PNEUMONIA.  + BODY ACHES

## 2018-12-04 NOTE — ED NOTES
WIFE ALSO REPORTS PATIENT WITH 20 LB WEIGHT LOSS OVER THE LAST 6 WEEKS WITHOUT TRYING AND LOSS OF APPETITE.

## 2018-12-04 NOTE — ED PROVIDER NOTES
Patient Seen in: [facility]    History   Patient presents with:  Cough/URI    Stated Complaint: Cough    HPI  The patient is here with his wife. He complains of a cough that started 5 or 6 days ago.   The cough is predomi None (Room air)       Current:/63   Pulse 73   Temp 97.6 °F (36.4 °C) (Oral)   Resp 24   Ht 188 cm (6' 2\")   Wt 95.7 kg   SpO2 97%   BMI 27.09 kg/m²         Physical Exam   Constitutional: He is oriented to person, place, and time.  He appears well-d appointment. Follow-up, return in home management were reviewed. If symptoms worsen or new or concerning symptoms develop they are to return promptly or go to the emergency department for further evaluation.       Disposition and Plan     Clinical Impress

## 2018-12-11 RX ORDER — SIMVASTATIN 40 MG
TABLET ORAL
Qty: 90 TABLET | Refills: 2 | Status: SHIPPED | OUTPATIENT
Start: 2018-12-11 | End: 2019-08-18 | Stop reason: SDUPTHER

## 2018-12-11 RX ORDER — LISINOPRIL 10 MG/1
TABLET ORAL
Qty: 90 TABLET | Refills: 2 | Status: SHIPPED | OUTPATIENT
Start: 2018-12-11 | End: 2019-08-18 | Stop reason: SDUPTHER

## 2018-12-21 ENCOUNTER — OFFICE VISIT (OUTPATIENT)
Dept: FAMILY MEDICINE CLINIC | Facility: CLINIC | Age: 78
End: 2018-12-21
Payer: MEDICARE

## 2018-12-21 VITALS
SYSTOLIC BLOOD PRESSURE: 102 MMHG | WEIGHT: 214 LBS | HEART RATE: 69 BPM | DIASTOLIC BLOOD PRESSURE: 63 MMHG | HEIGHT: 74 IN | BODY MASS INDEX: 27.46 KG/M2

## 2018-12-21 DIAGNOSIS — I10 ESSENTIAL HYPERTENSION: Primary | ICD-10-CM

## 2018-12-21 DIAGNOSIS — F41.9 ANXIETY: ICD-10-CM

## 2018-12-21 DIAGNOSIS — R79.89 LOW TESTOSTERONE: ICD-10-CM

## 2018-12-21 DIAGNOSIS — Z86.79 HISTORY OF ATRIAL FIBRILLATION: ICD-10-CM

## 2018-12-21 DIAGNOSIS — R53.82 CHRONIC FATIGUE: ICD-10-CM

## 2018-12-21 PROCEDURE — G0463 HOSPITAL OUTPT CLINIC VISIT: HCPCS | Performed by: FAMILY MEDICINE

## 2018-12-21 PROCEDURE — 99214 OFFICE O/P EST MOD 30 MIN: CPT | Performed by: FAMILY MEDICINE

## 2018-12-21 RX ORDER — SERTRALINE HYDROCHLORIDE 100 MG/1
150 TABLET, FILM COATED ORAL DAILY
Qty: 135 TABLET | Refills: 1 | Status: SHIPPED | OUTPATIENT
Start: 2018-12-21

## 2018-12-21 NOTE — PROGRESS NOTES
Pt with anxiety  Gets short of breath when he gets anxious  Has been on zoloft for years. Has fatigue  Has restless leg    Has low testosterone.     Here with wife and another daughter.    Elissa Currie labs from er visit and     ct chest /    11/7/2018    Had an

## 2019-01-02 ENCOUNTER — OFFICE VISIT (OUTPATIENT)
Dept: SLEEP CENTER | Age: 79
End: 2019-01-02
Attending: FAMILY MEDICINE
Payer: MEDICARE

## 2019-01-02 DIAGNOSIS — R53.83 FATIGUE, UNSPECIFIED TYPE: ICD-10-CM

## 2019-01-02 DIAGNOSIS — R06.83 SNORING: ICD-10-CM

## 2019-01-02 DIAGNOSIS — G47.33 OSA (OBSTRUCTIVE SLEEP APNEA): Primary | ICD-10-CM

## 2019-01-02 PROCEDURE — 95806 SLEEP STUDY UNATT&RESP EFFT: CPT

## 2019-01-04 NOTE — PROCEDURES
[address] [place] [address]  Accredited by the [employer] ([employer]ASM)    PATIENT'S NAME: Claude Mustard   ATTENDING PHYSICIAN: Waleska Graves. Obi Galeana DO   REFERRING PHYSICIAN: Waleska Graves.  Obi Galeana DO   PATIENT ACCOUNT #: [de-identified] this study is consistent with moderate obstructive sleep apnea (ICD-10 code G47.33). RECOMMENDATIONS:    1. CPAP titration. 2.   Weight loss. 3.   Avoid alcohol. 4.   Avoid sedating drug. 5.   Patient should not drive if at all sleepy.      Nevaeha

## 2019-01-10 ENCOUNTER — OFFICE VISIT (OUTPATIENT)
Dept: SLEEP CENTER | Age: 79
End: 2019-01-10
Attending: FAMILY MEDICINE
Payer: MEDICARE

## 2019-01-10 DIAGNOSIS — Z76.89 SLEEP CONCERN: Primary | ICD-10-CM

## 2019-01-10 PROCEDURE — 95811 POLYSOM 6/>YRS CPAP 4/> PARM: CPT

## 2019-01-13 NOTE — PROCEDURES
[facility]  Accredited by the [employer] (AASM)    PATIENT'S NAME: Marcy Espino   ATTENDING PHYSICIAN: Erwin Nichols. Ruby Cortez DO   REFERRING PHYSICIAN: Erwin Nichols.  Ruby Cortez DO   PATIENT ACCOUNT #: [de-identified] events per hour.   There were 205 periodic limb movements, for a periodic limb movement index of 47 events per hour, of which 8.3 per hour were associated with arousal.  The lowest desaturation was to 85%, the average heart rate was 59 beats per minute, and movements. Please do not hesitate to contact me if there is any question whatsoever regarding interpretation of this study.     Dictated By Migue Hernandez MD  d: 01/12/2019 18:01:36  t: 01/12/2019 19:44:39  Job [id number]  [facility]/    cc: Robi Bah

## 2019-01-14 DIAGNOSIS — Z86.79 HISTORY OF ATRIAL FIBRILLATION: ICD-10-CM

## 2019-01-14 DIAGNOSIS — M17.11 PRIMARY OSTEOARTHRITIS OF RIGHT KNEE: ICD-10-CM

## 2019-01-14 DIAGNOSIS — I48.91 ATRIAL FIBRILLATION, UNSPECIFIED TYPE (HCC): ICD-10-CM

## 2019-01-14 DIAGNOSIS — R53.82 CHRONIC FATIGUE: ICD-10-CM

## 2019-01-14 DIAGNOSIS — G25.81 RESTLESS LEG: Primary | ICD-10-CM

## 2019-01-14 DIAGNOSIS — I10 ESSENTIAL HYPERTENSION: ICD-10-CM

## 2019-01-15 ENCOUNTER — TELEPHONE (OUTPATIENT)
Dept: OTHER | Age: 79
End: 2019-01-15

## 2019-01-15 ENCOUNTER — NURSE TRIAGE (OUTPATIENT)
Dept: OTHER | Age: 79
End: 2019-01-15

## 2019-01-15 DIAGNOSIS — G47.33 OBSTRUCTIVE SLEEP APNEA (ADULT) (PEDIATRIC): Primary | ICD-10-CM

## 2019-01-15 NOTE — TELEPHONE ENCOUNTER
Notes recorded by Olga Sorto DO on 1/14/2019 at 3:33 PM CST  Stopped breathing 31 1/2 times an hour! Needs cpap machine.   Also recommended check of kidneys and B12 folate iron for his restless leg syndrome (orders in.)  Please set up machine for

## 2019-01-16 ENCOUNTER — LAB ENCOUNTER (OUTPATIENT)
Dept: LAB | Age: 79
End: 2019-01-16
Attending: FAMILY MEDICINE
Payer: MEDICARE

## 2019-01-16 DIAGNOSIS — I48.91 ATRIAL FIBRILLATION, UNSPECIFIED TYPE (HCC): ICD-10-CM

## 2019-01-16 DIAGNOSIS — R53.82 CHRONIC FATIGUE: ICD-10-CM

## 2019-01-16 DIAGNOSIS — M17.11 PRIMARY OSTEOARTHRITIS OF RIGHT KNEE: ICD-10-CM

## 2019-01-16 DIAGNOSIS — G25.81 RESTLESS LEG: ICD-10-CM

## 2019-01-16 DIAGNOSIS — I10 ESSENTIAL HYPERTENSION: ICD-10-CM

## 2019-01-16 DIAGNOSIS — Z86.79 HISTORY OF ATRIAL FIBRILLATION: ICD-10-CM

## 2019-01-16 LAB
ALBUMIN SERPL BCP-MCNC: 4.2 G/DL (ref 3.5–4.8)
ALBUMIN/GLOB SERPL: 1.7 {RATIO} (ref 1–2)
ALP SERPL-CCNC: 49 U/L (ref 32–100)
ALT SERPL-CCNC: 21 U/L (ref 17–63)
ANION GAP SERPL CALC-SCNC: 10 MMOL/L (ref 0–18)
AST SERPL-CCNC: 24 U/L (ref 15–41)
BASOPHILS # BLD: 0 K/UL (ref 0–0.2)
BASOPHILS NFR BLD: 1 %
BILIRUB SERPL-MCNC: 0.7 MG/DL (ref 0.3–1.2)
BUN SERPL-MCNC: 16 MG/DL (ref 8–20)
BUN/CREAT SERPL: 9.9 (ref 10–20)
CALCIUM SERPL-MCNC: 9.3 MG/DL (ref 8.5–10.5)
CHLORIDE SERPL-SCNC: 100 MMOL/L (ref 95–110)
CO2 SERPL-SCNC: 27 MMOL/L (ref 22–32)
CREAT SERPL-MCNC: 1.61 MG/DL (ref 0.5–1.5)
EOSINOPHIL # BLD: 0.2 K/UL (ref 0–0.7)
EOSINOPHIL NFR BLD: 4 %
ERYTHROCYTE [DISTWIDTH] IN BLOOD BY AUTOMATED COUNT: 14.1 % (ref 11–15)
FOLATE SERPL-MCNC: 15.2 NG/ML
GLOBULIN PLAS-MCNC: 2.5 G/DL (ref 2.5–3.7)
GLUCOSE SERPL-MCNC: 97 MG/DL (ref 70–99)
HCT VFR BLD AUTO: 39.5 % (ref 41–52)
HGB BLD-MCNC: 13.5 G/DL (ref 13.5–17.5)
IRON SATN MFR SERPL: 25 % (ref 20–55)
IRON SERPL-MCNC: 66 MCG/DL (ref 45–182)
LYMPHOCYTES # BLD: 1.6 K/UL (ref 1–4)
LYMPHOCYTES NFR BLD: 28 %
MCH RBC QN AUTO: 31.7 PG (ref 27–32)
MCHC RBC AUTO-ENTMCNC: 34.2 G/DL (ref 32–37)
MCV RBC AUTO: 92.7 FL (ref 80–100)
MONOCYTES # BLD: 0.5 K/UL (ref 0–1)
MONOCYTES NFR BLD: 8 %
NEUTROPHILS # BLD AUTO: 3.5 K/UL (ref 1.8–7.7)
NEUTROPHILS NFR BLD: 60 %
OSMOLALITY UR CALC.SUM OF ELEC: 285 MOSM/KG (ref 275–295)
PATIENT FASTING: YES
PLATELET # BLD AUTO: 256 K/UL (ref 140–400)
PMV BLD AUTO: 8.3 FL (ref 7.4–10.3)
POTASSIUM SERPL-SCNC: 3.2 MMOL/L (ref 3.3–5.1)
PROT SERPL-MCNC: 6.7 G/DL (ref 5.9–8.4)
RBC # BLD AUTO: 4.26 M/UL (ref 4.5–5.9)
SODIUM SERPL-SCNC: 137 MMOL/L (ref 136–144)
TIBC SERPL-MCNC: 268 MCG/DL (ref 228–428)
TRANSFERRIN SERPL-MCNC: 203 MG/DL (ref 180–329)
VIT B12 SERPL-MCNC: >1500 PG/ML (ref 181–914)
WBC # BLD AUTO: 5.9 K/UL (ref 4–11)

## 2019-01-16 PROCEDURE — 83540 ASSAY OF IRON: CPT

## 2019-01-16 PROCEDURE — 80053 COMPREHEN METABOLIC PANEL: CPT

## 2019-01-16 PROCEDURE — 82607 VITAMIN B-12: CPT

## 2019-01-16 PROCEDURE — 85025 COMPLETE CBC W/AUTO DIFF WBC: CPT

## 2019-01-16 PROCEDURE — 82746 ASSAY OF FOLIC ACID SERUM: CPT

## 2019-01-16 PROCEDURE — 84466 ASSAY OF TRANSFERRIN: CPT

## 2019-01-16 PROCEDURE — 36415 COLL VENOUS BLD VENIPUNCTURE: CPT

## 2019-01-16 NOTE — TELEPHONE ENCOUNTER
1/15/19 1:35 PM   Josselin Kaiser RN routed this conversation to Josselin Wise RN     1/15/19 1:34 PM   Note      Notes recorded by Alyx Jolly DO on 1/14/2019 at 3:33 PM CST  Stopped breathing 31 1/2 times an hour!   Needs cpap machine

## 2019-01-17 ENCOUNTER — TELEPHONE (OUTPATIENT)
Dept: OTHER | Age: 79
End: 2019-01-17

## 2019-01-17 DIAGNOSIS — G47.33 OSA (OBSTRUCTIVE SLEEP APNEA): Primary | ICD-10-CM

## 2019-01-18 NOTE — TELEPHONE ENCOUNTER
Please see 1/15/19 CPAP encounter--order in progress    Patient completed labs--please review labs and advise    Please respond to pool: AHMET JAY LMB LPN/JAGDISH      Conversation: Cpap Issue   (Newest Message First)   January 17, 2019   Jeremy Alas

## 2019-01-18 NOTE — TELEPHONE ENCOUNTER
Notes recorded by Pradip Camp DO on 1/14/2019 at 3:33 PM CST  Stopped breathing 31 1/2 times an hour! Needs cpap machine.   Also recommended check of kidneys and B12 folate iron for his restless leg syndrome (orders in.)  Please set up machine for

## 2019-01-22 ENCOUNTER — TELEPHONE (OUTPATIENT)
Dept: CARDIOLOGY | Age: 79
End: 2019-01-22

## 2019-01-23 ENCOUNTER — TELEPHONE (OUTPATIENT)
Dept: CARDIOLOGY | Age: 79
End: 2019-01-23

## 2019-01-24 ENCOUNTER — LAB ENCOUNTER (OUTPATIENT)
Dept: LAB | Age: 79
End: 2019-01-24
Attending: INTERNAL MEDICINE
Payer: MEDICARE

## 2019-01-24 ENCOUNTER — TELEPHONE (OUTPATIENT)
Dept: CARDIOLOGY | Age: 79
End: 2019-01-24

## 2019-01-24 DIAGNOSIS — E23.7 PITUITARY ABNORMALITY (HCC): ICD-10-CM

## 2019-01-24 DIAGNOSIS — E23.0 HYPOPITUITARISM (HCC): ICD-10-CM

## 2019-01-24 DIAGNOSIS — C61 PROSTATE CANCER (HCC): Primary | ICD-10-CM

## 2019-01-24 LAB
ALBUMIN SERPL BCP-MCNC: 3.8 G/DL (ref 3.5–4.8)
ALBUMIN/GLOB SERPL: 1.5 {RATIO} (ref 1–2)
ALP SERPL-CCNC: 49 U/L (ref 32–100)
ALT SERPL-CCNC: 19 U/L (ref 17–63)
ANION GAP SERPL CALC-SCNC: 11 MMOL/L (ref 0–18)
AST SERPL-CCNC: 23 U/L (ref 15–41)
BILIRUB SERPL-MCNC: 0.7 MG/DL (ref 0.3–1.2)
BUN SERPL-MCNC: 17 MG/DL (ref 8–20)
BUN/CREAT SERPL: 10.6 (ref 10–20)
CALCIUM SERPL-MCNC: 9.2 MG/DL (ref 8.5–10.5)
CHLORIDE SERPL-SCNC: 102 MMOL/L (ref 95–110)
CO2 SERPL-SCNC: 24 MMOL/L (ref 22–32)
CORTIS SERPL-MCNC: 4.5 MCG/DL
CREAT SERPL-MCNC: 1.6 MG/DL (ref 0.5–1.5)
FSH SERPL-ACNC: 4.7 MIU/ML (ref 1.3–19.3)
GLOBULIN PLAS-MCNC: 2.5 G/DL (ref 2.5–3.7)
GLUCOSE SERPL-MCNC: 93 MG/DL (ref 70–99)
LH SERPL-ACNC: 1.2 MIU/ML (ref 1.2–8.6)
OSMOLALITY UR CALC.SUM OF ELEC: 285 MOSM/KG (ref 275–295)
PATIENT FASTING: YES
POTASSIUM SERPL-SCNC: 3.2 MMOL/L (ref 3.3–5.1)
PROLACTIN SERPL-MCNC: 31.2 NG/ML (ref 1.4–24.2)
PROT SERPL-MCNC: 6.3 G/DL (ref 5.9–8.4)
PSA SERPL-MCNC: 0 NG/ML (ref 0–4)
PSA SERPL-MCNC: <0.01 NG/ML (ref 0.01–4)
SODIUM SERPL-SCNC: 137 MMOL/L (ref 136–144)
T4 FREE SERPL-MCNC: 0.6 NG/DL (ref 0.58–1.64)
TSH SERPL-ACNC: 1.22 UIU/ML (ref 0.45–5.33)

## 2019-01-24 PROCEDURE — 82024 ASSAY OF ACTH: CPT

## 2019-01-24 PROCEDURE — 84146 ASSAY OF PROLACTIN: CPT

## 2019-01-24 PROCEDURE — 36415 COLL VENOUS BLD VENIPUNCTURE: CPT

## 2019-01-24 PROCEDURE — 84153 ASSAY OF PSA TOTAL: CPT

## 2019-01-24 PROCEDURE — 80053 COMPREHEN METABOLIC PANEL: CPT

## 2019-01-24 PROCEDURE — 84439 ASSAY OF FREE THYROXINE: CPT

## 2019-01-24 PROCEDURE — 83002 ASSAY OF GONADOTROPIN (LH): CPT

## 2019-01-24 PROCEDURE — 83001 ASSAY OF GONADOTROPIN (FSH): CPT

## 2019-01-24 PROCEDURE — 82533 TOTAL CORTISOL: CPT

## 2019-01-24 PROCEDURE — 84402 ASSAY OF FREE TESTOSTERONE: CPT

## 2019-01-24 PROCEDURE — 84443 ASSAY THYROID STIM HORMONE: CPT

## 2019-01-24 PROCEDURE — 84403 ASSAY OF TOTAL TESTOSTERONE: CPT

## 2019-01-24 PROCEDURE — 84305 ASSAY OF SOMATOMEDIN: CPT

## 2019-01-24 NOTE — TELEPHONE ENCOUNTER
Patient has Medicare, no referrals are required for this health plan.     Thank you, Constance Oliveira Small-Referral Specialist.

## 2019-01-25 LAB — ADRENOCORTICOTROPIC HORMONE: 19 PG/ML

## 2019-01-26 LAB
TESTOSTERONE, FREE, S: 0.18 NG/DL
TESTOSTERONE, TOTAL, S: 12 NG/DL

## 2019-01-28 LAB
IGF 1 Z SCORE CALCULATION: -2.4
IGF-1 (INSULINE-LIKE GROWTH FACTOR 1): 28 NG/ML

## 2019-02-01 ENCOUNTER — APPOINTMENT (OUTPATIENT)
Dept: LAB | Age: 79
End: 2019-02-01
Attending: FAMILY MEDICINE
Payer: MEDICARE

## 2019-02-01 DIAGNOSIS — E87.6 HYPOKALEMIA: ICD-10-CM

## 2019-02-01 LAB
ANION GAP SERPL CALC-SCNC: 11 MMOL/L (ref 0–18)
BUN SERPL-MCNC: 17 MG/DL (ref 8–20)
BUN/CREAT SERPL: 10.4 (ref 10–20)
CALCIUM SERPL-MCNC: 9.2 MG/DL (ref 8.5–10.5)
CHLORIDE SERPL-SCNC: 103 MMOL/L (ref 95–110)
CO2 SERPL-SCNC: 25 MMOL/L (ref 22–32)
CREAT SERPL-MCNC: 1.64 MG/DL (ref 0.5–1.5)
GLUCOSE SERPL-MCNC: 93 MG/DL (ref 70–99)
OSMOLALITY UR CALC.SUM OF ELEC: 289 MOSM/KG (ref 275–295)
POTASSIUM SERPL-SCNC: 3.5 MMOL/L (ref 3.3–5.1)
SODIUM SERPL-SCNC: 139 MMOL/L (ref 136–144)

## 2019-02-01 PROCEDURE — 36415 COLL VENOUS BLD VENIPUNCTURE: CPT

## 2019-02-01 PROCEDURE — 80048 BASIC METABOLIC PNL TOTAL CA: CPT

## 2019-02-04 ENCOUNTER — TELEPHONE (OUTPATIENT)
Dept: FAMILY MEDICINE CLINIC | Facility: CLINIC | Age: 79
End: 2019-02-04

## 2019-02-04 NOTE — TELEPHONE ENCOUNTER
Shania/TORI called in stating that she received the order for BiPap for Pt, however, because Pt resides in [place] their company is unable to provide service. Please be aware.

## 2019-02-05 NOTE — TELEPHONE ENCOUNTER
Taylor Dominguezes the results of his sleep studies to [place] physician [place]. He needs the treatment for the sleep apnea  And they can get him started out there.

## 2019-03-06 ENCOUNTER — LAB ENCOUNTER (OUTPATIENT)
Dept: LAB | Age: 79
End: 2019-03-06
Attending: INTERNAL MEDICINE
Payer: MEDICARE

## 2019-03-06 DIAGNOSIS — E23.0 PANHYPOPITUITARISM (HCC): ICD-10-CM

## 2019-03-06 DIAGNOSIS — D35.2 BENIGN NEOPLASM OF PITUITARY GLAND AND CRANIOPHARYNGEAL DUCT (POUCH) (HCC): Primary | ICD-10-CM

## 2019-03-06 DIAGNOSIS — I10 ESSENTIAL HYPERTENSION, MALIGNANT: ICD-10-CM

## 2019-03-06 DIAGNOSIS — D35.3 BENIGN NEOPLASM OF PITUITARY GLAND AND CRANIOPHARYNGEAL DUCT (POUCH) (HCC): Primary | ICD-10-CM

## 2019-03-06 LAB
ANION GAP SERPL CALC-SCNC: 6 MMOL/L (ref 0–18)
BUN BLD-MCNC: 21 MG/DL (ref 7–18)
BUN/CREAT SERPL: 12.5 (ref 10–20)
CALCIUM BLD-MCNC: 9 MG/DL (ref 8.5–10.1)
CHLORIDE SERPL-SCNC: 108 MMOL/L (ref 98–107)
CO2 SERPL-SCNC: 28 MMOL/L (ref 21–32)
CREAT BLD-MCNC: 1.68 MG/DL (ref 0.7–1.3)
GLUCOSE BLD-MCNC: 74 MG/DL (ref 70–99)
OSMOLALITY SERPL CALC.SUM OF ELEC: 296 MOSM/KG (ref 275–295)
POTASSIUM SERPL-SCNC: 3.7 MMOL/L (ref 3.5–5.1)
PROLACTIN SERPL-MCNC: 29.3 NG/ML (ref 2.5–17.4)
SODIUM SERPL-SCNC: 142 MMOL/L (ref 136–145)
T4 FREE SERPL-MCNC: 0.8 NG/DL (ref 0.8–1.7)

## 2019-03-06 PROCEDURE — 36415 COLL VENOUS BLD VENIPUNCTURE: CPT

## 2019-03-06 PROCEDURE — 84439 ASSAY OF FREE THYROXINE: CPT

## 2019-03-06 PROCEDURE — 84146 ASSAY OF PROLACTIN: CPT

## 2019-03-06 PROCEDURE — 80048 BASIC METABOLIC PNL TOTAL CA: CPT

## 2019-03-25 ENCOUNTER — OFFICE VISIT (OUTPATIENT)
Dept: CARDIOLOGY | Age: 79
End: 2019-03-25
Payer: MEDICARE

## 2019-03-25 VITALS
DIASTOLIC BLOOD PRESSURE: 66 MMHG | WEIGHT: 217 LBS | SYSTOLIC BLOOD PRESSURE: 104 MMHG | HEART RATE: 72 BPM | BODY MASS INDEX: 27.85 KG/M2 | HEIGHT: 74 IN

## 2019-03-25 DIAGNOSIS — I10 SYSTEMIC PRIMARY ARTERIAL HYPERTENSION: Primary | ICD-10-CM

## 2019-03-25 DIAGNOSIS — I51.7 MILD CONCENTRIC LEFT VENTRICULAR HYPERTROPHY (LVH): ICD-10-CM

## 2019-03-25 DIAGNOSIS — I49.3 PVC (PREMATURE VENTRICULAR CONTRACTION): ICD-10-CM

## 2019-03-25 PROCEDURE — 1036F TOBACCO NON-USER: CPT | Performed by: CLINICAL NURSE SPECIALIST

## 2019-03-25 PROCEDURE — G8599 NO ASA/ANTIPLAT THER USE RNG: HCPCS | Performed by: CLINICAL NURSE SPECIALIST

## 2019-03-25 PROCEDURE — G8427 DOCREV CUR MEDS BY ELIG CLIN: HCPCS | Performed by: CLINICAL NURSE SPECIALIST

## 2019-03-25 PROCEDURE — 4040F PNEUMOC VAC/ADMIN/RCVD: CPT | Performed by: CLINICAL NURSE SPECIALIST

## 2019-03-25 PROCEDURE — 99213 OFFICE O/P EST LOW 20 MIN: CPT | Performed by: CLINICAL NURSE SPECIALIST

## 2019-03-25 PROCEDURE — 1101F PT FALLS ASSESS-DOCD LE1/YR: CPT | Performed by: CLINICAL NURSE SPECIALIST

## 2019-03-25 PROCEDURE — 1123F ACP DISCUSS/DSCN MKR DOCD: CPT | Performed by: CLINICAL NURSE SPECIALIST

## 2019-03-25 PROCEDURE — G8484 FLU IMMUNIZE NO ADMIN: HCPCS | Performed by: CLINICAL NURSE SPECIALIST

## 2019-03-25 PROCEDURE — G8417 CALC BMI ABV UP PARAM F/U: HCPCS | Performed by: CLINICAL NURSE SPECIALIST

## 2019-03-25 RX ORDER — HYDROCORTISONE 10 MG/1
TABLET ORAL
COMMUNITY

## 2019-03-25 ASSESSMENT — ENCOUNTER SYMPTOMS
CHEST TIGHTNESS: 0
EYE REDNESS: 0
FACIAL SWELLING: 0
WHEEZING: 0
COUGH: 0
ABDOMINAL PAIN: 0
SHORTNESS OF BREATH: 0
VOMITING: 0
NAUSEA: 0

## 2019-04-26 RX ORDER — LEVOTHYROXINE SODIUM 0.05 MG/1
88 TABLET ORAL DAILY
COMMUNITY

## 2019-04-26 RX ORDER — LISINOPRIL 10 MG/1
10 TABLET ORAL DAILY
COMMUNITY

## 2019-04-26 RX ORDER — INDAPAMIDE 2.5 MG/1
2.5 TABLET, FILM COATED ORAL EVERY MORNING
COMMUNITY
End: 2019-05-01

## 2019-04-26 RX ORDER — SIMVASTATIN 40 MG
40 TABLET ORAL NIGHTLY
COMMUNITY

## 2019-04-26 RX ORDER — OMEPRAZOLE 20 MG/1
20 CAPSULE, DELAYED RELEASE ORAL DAILY
COMMUNITY

## 2019-05-01 ENCOUNTER — OFFICE VISIT (OUTPATIENT)
Dept: PULMONOLOGY | Facility: CLINIC | Age: 79
End: 2019-05-01

## 2019-05-01 VITALS
HEIGHT: 74 IN | WEIGHT: 232 LBS | HEART RATE: 60 BPM | DIASTOLIC BLOOD PRESSURE: 64 MMHG | OXYGEN SATURATION: 97 % | BODY MASS INDEX: 29.77 KG/M2 | SYSTOLIC BLOOD PRESSURE: 122 MMHG

## 2019-05-01 DIAGNOSIS — R94.2 NONSPECIFIC ABNORMAL RESULTS OF PULMONARY SYSTEM FUNCTION STUDY: Primary | ICD-10-CM

## 2019-05-01 DIAGNOSIS — R91.1 NODULE OF UPPER LOBE OF RIGHT LUNG: ICD-10-CM

## 2019-05-01 PROCEDURE — 99213 OFFICE O/P EST LOW 20 MIN: CPT | Performed by: INTERNAL MEDICINE

## 2019-05-01 RX ORDER — HYDROCORTISONE 10 MG/1
TABLET ORAL
COMMUNITY
Start: 2019-02-16

## 2019-05-01 NOTE — PROGRESS NOTES
Subjective   John Irby is a 78 y.o. male.     Background: Pt with small airway disease and normal FEV1, lung nodule in RUL, 2 cm, stable between 2016 and 4/2019    Chief Complaint   Patient presents with   • Lung Nodule        History of Present Illness   Pt follows up after CT chest confirms benign nature of rul nodule, stable > 2 years.  [facility].  He developed adrenal insufficiency and was diagnosed and treated in [place].  He has had benign findings on ct scans.  He is breathing well now but was short of breath in the middle of the adrenal crisis.  He has some fatigue. He is still using the breo.      Medical/Family/Social History   has a past medical history of Arthritis, Cancer (CMS/HCC), and Hypertension.   has a past surgical history that includes Colonoscopy.  family history includes Hypertension in his mother.   reports that he has never smoked. He has never used smokeless tobacco. He reports that he drinks alcohol. He reports that he does not use drugs.  No Known Allergies  Medications    Current Outpatient Medications:   •  Fluticasone Furoate-Vilanterol (BREO ELLIPTA) 200-25 MCG/INH inhaler, Inhale 1 puff Daily., Disp: , Rfl:   •  hydrocortisone (CORTEF) 10 MG tablet, 20 mg in AM and 10 mg in PM, Disp: , Rfl:   •  levothyroxine (SYNTHROID, LEVOTHROID) 50 MCG tablet, Take 50 mcg by mouth Daily., Disp: , Rfl:   •  lisinopril (PRINIVIL,ZESTRIL) 10 MG tablet, Take 10 mg by mouth Daily., Disp: , Rfl:   •  Multiple Vitamin (MULTIVITAMINS PO), Take  by mouth., Disp: , Rfl:   •  omeprazole (priLOSEC) 20 MG capsule, Take 20 mg by mouth Daily., Disp: , Rfl:   •  sertraline (ZOLOFT) 50 MG tablet, Take 50 mg by mouth Daily., Disp: , Rfl:   •  simvastatin (ZOCOR) 40 MG tablet, Take 40 mg by mouth Every Night., Disp: , Rfl:     Review of Systems   Constitutional: Negative for chills and fever.   Cardiovascular: Negative for chest pain.   Gastrointestinal: Negative for  "nausea and vomiting.     Objective   /64   Pulse 60   Ht 188 cm (74\")   Wt 105 kg (232 lb)   SpO2 97% Comment: RA  BMI 29.79 kg/m²   Physical Exam   Constitutional: He appears well-developed and well-nourished. He does not appear ill. No distress.   HENT:   Head: Normocephalic and atraumatic.   Nose: Nose normal.   Eyes: Conjunctivae and EOM are normal.   Neck: Neck supple.   Cardiovascular: Normal rate, regular rhythm, S1 normal and S2 normal.   No murmur heard.  Pulmonary/Chest: Effort normal. He has no wheezes. He has no rales.   Abdominal: Soft. He exhibits no distension. There is no tenderness. There is no guarding.   Musculoskeletal: He exhibits no deformity.   Neurological: He is alert.   Skin: Skin is warm and dry. No rash noted.   Psychiatric: He has a normal mood and affect.     Assessment/Plan   Problem List Items Addressed This Visit     None      Visit Diagnoses     Nonspecific abnormal results of pulmonary system function study    -  Primary    reduced midflow suggesting small airway disease.    Nodule of upper lobe of right lung        stable [id number], with calcification        Patient's Body mass index is 29.79 kg/m². BMI is within normal parameters. No follow-up required..    He is stable from pulmonary standpoint.  Continue breo and albuterol.      Electronically signed by Jatinder Mcginnis MD, 5/1/2019, 11:41 AM    "

## 2019-08-19 RX ORDER — SIMVASTATIN 40 MG
TABLET ORAL
Qty: 90 TABLET | Refills: 3 | Status: SHIPPED | OUTPATIENT
Start: 2019-08-19

## 2019-08-19 RX ORDER — LISINOPRIL 10 MG/1
TABLET ORAL
Qty: 90 TABLET | Refills: 3 | Status: SHIPPED | OUTPATIENT
Start: 2019-08-19 | End: 2020-06-22

## 2019-11-04 ENCOUNTER — OFFICE VISIT (OUTPATIENT)
Dept: PULMONOLOGY | Facility: CLINIC | Age: 79
End: 2019-11-04

## 2019-11-04 VITALS
HEIGHT: 74 IN | SYSTOLIC BLOOD PRESSURE: 140 MMHG | DIASTOLIC BLOOD PRESSURE: 90 MMHG | OXYGEN SATURATION: 95 % | HEART RATE: 68 BPM | WEIGHT: 244.8 LBS | BODY MASS INDEX: 31.42 KG/M2

## 2019-11-04 DIAGNOSIS — R91.1 NODULE OF UPPER LOBE OF RIGHT LUNG: ICD-10-CM

## 2019-11-04 DIAGNOSIS — R94.2 NONSPECIFIC ABNORMAL RESULTS OF PULMONARY SYSTEM FUNCTION STUDY: Primary | ICD-10-CM

## 2019-11-04 PROCEDURE — 99213 OFFICE O/P EST LOW 20 MIN: CPT | Performed by: INTERNAL MEDICINE

## 2019-11-04 PROCEDURE — 94010 BREATHING CAPACITY TEST: CPT | Performed by: INTERNAL MEDICINE

## 2019-11-04 NOTE — PROCEDURES
Pulmonary Function Test  Performed by: Jatinder Mcginnis MD  Authorized by: Jatinder Mcginnis MD      Pre Drug    FVC: 100%   FEV1: 94%   FEF 25-75%: 75%   FEV1/FVC: 73.19%

## 2019-11-04 NOTE — PROGRESS NOTES
"Subjective   John Irby is a 79 y.o. male.     Background: Pt with small airway disease and normal FEV1, lung nodule in RUL, 2 cm, stable between 2016 and 4/2019    Chief Complaint   Patient presents with   • abnormal pft        History of Present Illness   When he is doing physical work he has noticed some increased dyspnea on exertion in his chest.  He has been having to take hydrocortisone for the past year.  Has noted increased weight    Medical/Family/Social History   has a past medical history of Arthritis, Cancer (CMS/HCC), and Hypertension.   has a past surgical history that includes Colonoscopy.  family history includes Hypertension in his mother.   reports that he has never smoked. He has never used smokeless tobacco. He reports that he drinks alcohol. He reports that he does not use drugs.  No Known Allergies  Medications    Current Outpatient Medications:   •  BREO ELLIPTA 200-25 MCG/INH inhaler, USE 1 INHALATION DAILY, Disp: 3 inhaler, Rfl: 3  •  hydrocortisone (CORTEF) 10 MG tablet, 20 mg in AM and 10 mg in PM, Disp: , Rfl:   •  levothyroxine (SYNTHROID, LEVOTHROID) 50 MCG tablet, Take 50 mcg by mouth Daily., Disp: , Rfl:   •  lisinopril (PRINIVIL,ZESTRIL) 10 MG tablet, Take 10 mg by mouth Daily., Disp: , Rfl:   •  Multiple Vitamin (MULTIVITAMINS PO), Take  by mouth., Disp: , Rfl:   •  omeprazole (priLOSEC) 20 MG capsule, Take 20 mg by mouth Daily., Disp: , Rfl:   •  sertraline (ZOLOFT) 50 MG tablet, Take 50 mg by mouth Daily., Disp: , Rfl:   •  simvastatin (ZOCOR) 40 MG tablet, Take 40 mg by mouth Every Night., Disp: , Rfl:     Review of Systems   Constitutional: Negative for chills and fever.   Cardiovascular: Negative for chest pain.   Gastrointestinal: Negative for nausea and vomiting.     Objective   /90   Pulse 68   Ht 188 cm (74\")   Wt 111 kg (244 lb 12.8 oz)   SpO2 95% Comment: ra  BMI 31.43 kg/m²   Physical Exam   Constitutional: He appears well-developed and " well-nourished. He does not appear ill. No distress.   HENT:   Head: Normocephalic and atraumatic.   Nose: Nose normal.   Eyes: EOM are normal.   Neck: Neck supple.   Cardiovascular: Normal rate, regular rhythm, S1 normal and S2 normal.   Pulmonary/Chest: Effort normal. He has no wheezes. He has no rales.   Abdominal: Soft. He exhibits no distension. There is no tenderness. There is no guarding.   Neurological: He is alert.   Skin: Skin is warm and dry. No rash noted.   Psychiatric: He has a normal mood and affect.        Pulmonary Function Test  Performed by: Jatinder Mcginnis MD  Authorized by: Jatinder Mcginnis MD      Pre Drug    FVC: 100%   FEV1: 94%   FEF 25-75%: 75%   FEV1/FVC: 73.19%            My interpretation of PFT: normal      -----------------------------------------------------------------------------------------------  Assessment/Plan   Problem List Items Addressed This Visit     None      Visit Diagnoses     Nonspecific abnormal results of pulmonary system function study    -  Primary    Relevant Orders    Pulmonary Function Test (Completed)    Nodule of upper lobe of right lung            Patient's Body mass index is 31.43 kg/m². BMI is above normal parameters. Recommendations include: referral to primary care.    PFT have normalized  Nodule has met benign criteria.  He has presumed bronchospastic component  Recommend preemptive albuterol prior to exertion  Consider alternative dx for exertional dyspnea (cardiac, endocrine) which he will discuss with his other doctors       Electronically signed by Jatinder Mcginnis MD, 11/4/2019, 1:20 PM

## 2020-05-28 ENCOUNTER — OFFICE VISIT (OUTPATIENT)
Dept: PULMONOLOGY | Facility: CLINIC | Age: 80
End: 2020-05-28

## 2020-05-28 ENCOUNTER — TELEPHONE (OUTPATIENT)
Dept: PULMONOLOGY | Facility: CLINIC | Age: 80
End: 2020-05-28

## 2020-05-28 VITALS
SYSTOLIC BLOOD PRESSURE: 136 MMHG | OXYGEN SATURATION: 97 % | TEMPERATURE: 98.1 F | WEIGHT: 241 LBS | BODY MASS INDEX: 30.93 KG/M2 | DIASTOLIC BLOOD PRESSURE: 72 MMHG | RESPIRATION RATE: 16 BRPM | HEIGHT: 74 IN | HEART RATE: 72 BPM

## 2020-05-28 DIAGNOSIS — E66.3 OVERWEIGHT: ICD-10-CM

## 2020-05-28 DIAGNOSIS — J98.4 SMALL AIRWAYS DISEASE: ICD-10-CM

## 2020-05-28 DIAGNOSIS — R91.1 LUNG NODULE: ICD-10-CM

## 2020-05-28 DIAGNOSIS — G47.33 OBSTRUCTIVE SLEEP APNEA: ICD-10-CM

## 2020-05-28 DIAGNOSIS — R06.02 SHORTNESS OF BREATH: Primary | ICD-10-CM

## 2020-05-28 PROCEDURE — 99214 OFFICE O/P EST MOD 30 MIN: CPT | Performed by: NURSE PRACTITIONER

## 2020-05-28 RX ORDER — ALBUTEROL SULFATE 90 UG/1
2 AEROSOL, METERED RESPIRATORY (INHALATION) EVERY 4 HOURS PRN
COMMUNITY

## 2020-05-28 NOTE — PROGRESS NOTES
" CHRISTOPHE Handley  [facility]   Res[facility] Disease Clinic  [address] [place] [address]  Phone: [phone]  Fax: [phone]     John Irby is a 79 y.o. male.   : 1940  CC:   Chief Complaint   Patient presents with   • \"Breathing heavy\".      HPI: John Irby is a pleasant 79 y.o. male. The patient is here today for follow up of shortness of breath.  Patient with small airway disease and normal FEV1, lung nodule in RUL, 2 cm, stable between  and 2019.  The patient c/o of shortness of breath with exertion and bending over.  He states he was diagnosed with ALTHEA and was suppose to wear NIPPV prior to his move from [place].  He denies fever, chills, or cough with purulent sputum.  He is using breo 200 and albuterol HFA as needed.   The patient's PCP is Jennifer Melo MD.    The following portions of the patient's history were reviewed and updated as appropriate: allergies, current medications, past family history, past medical history, past social history, past surgical history and problem list.  Past Medical History:   Diagnosis Date   • Arthritis    • Cancer (CMS/HCC)    • Hypertension      Family History   Problem Relation Age of Onset   • Hypertension Mother    • No Known Problems Father    • No Known Problems Sister    • No Known Problems Brother    • No Known Problems Maternal Aunt    • No Known Problems Maternal Uncle    • No Known Problems Paternal Aunt    • No Known Problems Paternal Uncle    • No Known Problems Maternal Grandmother    • No Known Problems Maternal Grandfather    • No Known Problems Paternal Grandmother    • No Known Problems Paternal Grandfather    • No Known Problems Other    • Asthma Neg Hx    • Cancer Neg Hx    • Diabetes Neg Hx    • Emphysema Neg Hx    • Heart failure Neg Hx      Social History     Socioeconomic History   • Marital status:      Spouse name: Not on file   • Number of children: Not on file   • Years of " "education: Not on file   • Highest education level: Not on file   Tobacco Use   • Smoking status: Former Smoker     Packs/day: 0.50     Types: Cigarettes     Start date:      Last attempt to quit: 1960     Years since quittin.4   • Smokeless tobacco: Never Used   Substance and Sexual Activity   • Alcohol use: Yes     Comment: social drinker; 4-15 drinks a week.    • Drug use: No   • Sexual activity: Defer     Review of Systems   Constitutional: Negative for chills and fever.   HENT: Negative for congestion.    Eyes: Negative for blurred vision.   Respiratory: Positive for shortness of breath. Negative for cough.    Cardiovascular: Negative for chest pain.   Gastrointestinal: Negative for diarrhea, nausea and vomiting.   Endocrine: Negative for cold intolerance and heat intolerance.   Genitourinary: Negative for dysuria.   Musculoskeletal: Negative for arthralgias.   Skin: Negative for rash.   Neurological: Negative for dizziness, weakness and light-headedness.   Hematological: Does not bruise/bleed easily.   Psychiatric/Behavioral: Negative for agitation. The patient is not nervous/anxious.      /72   Pulse 72   Temp 98.1 °F (36.7 °C)   Resp 16   Ht 188 cm (74\")   Wt 109 kg (241 lb)   SpO2 97% Comment: RA  BMI 30.94 kg/m²   Physical Exam   Constitutional: He is oriented to person, place, and time. He appears well-developed and well-nourished. No distress. He appears overweight.   HENT:   Head: Normocephalic and atraumatic.   Eyes: Pupils are equal, round, and reactive to light. Conjunctivae and EOM are normal. No scleral icterus.   Neck: Normal range of motion. Neck supple.   Cardiovascular: Normal rate, regular rhythm and normal heart sounds. Exam reveals no friction rub.   No murmur heard.  Pulmonary/Chest: Effort normal and breath sounds normal. No respiratory distress. He has no wheezes. He has no rales.   Abdominal: Soft. Bowel sounds are normal. He exhibits no distension. There is no " "tenderness.   Musculoskeletal: Normal range of motion. He exhibits no edema.   Neurological: He is alert and oriented to person, place, and time.   Skin: Skin is warm and dry.   Psychiatric: He has a normal mood and affect. His behavior is normal. Judgment and thought content normal.   Nursing note and vitals reviewed.      Pulmonary Functions Testing Results:  PFT Values        Some values may be hidden. Unless noted otherwise, only the newest values recorded on each date are displayed.         Old Values PFT Results 11/4/19   No data to display.      Pre Drug PFT Results 11/4/19      FEV1 94   FEF 25-75% 75   FEV1/FVC 73.19      Post Drug PFT Results 11/4/19   No data to display.      Other Tests PFT Results 11/4/19   No data to display.              My PFT Interpretation: none to review today     Imaging: none to review today     Assessment and Plan:   John BLISS was seen today for \"breathing heavy\"..    Diagnoses and all orders for this visit:    Shortness of breath  The patient complains of shortness of breath with exertion and bending over.  The patient has known small airway disease with a normal FEV1.  He is currently on Breo 200 and an albuterol rescue inhaler.  May consider another source such as body habitus, cardiac, endocrine, or anxiety regarding shortness of breath as well.  Small airways disease  Continue Breo and albuterol rescue inhaler as needed.  Obstructive sleep apnea  -     Overnight Sleep Oximetry Study; Future  The patient has known sleep apnea.  He is not using a noninvasive positive pressure ventilation device at this time.  We will obtain an overnight pulse ox on room air.  Would strongly recommend treatment of ALTHEA.    Lung nodule  Has met benign criteria   Overweight  Defer to pcp     Health maintenance:   Influenza vaccine: Yes  Pneumovax 23: Yes  Prevnar 13: Yes  Patient's Body mass index is 30.94 kg/m². BMI is above normal parameters. Recommendations include: Defer to " PCP.    Follow up: keep f/u with Dr. Rahel Treviño, APRN  5/28/2020  11:56    Please note that portions of this note were completed with a voice recognition program.

## 2020-06-03 NOTE — TELEPHONE ENCOUNTER
I spoke with Enid at [facility] and they can accept the 2 year old sleep study.  They will just need f2f, demographics, and the sleep study faxed to them.  (Rahel if you can print the study from [facility] everywhere, or show me how to find it, and put on my desk I can do the rest.)

## 2020-06-05 DIAGNOSIS — G47.33 OBSTRUCTIVE SLEEP APNEA: Primary | ICD-10-CM

## 2020-06-05 NOTE — TELEPHONE ENCOUNTER
I have faxed rikki, braeden, sleep study to [facility].  Awaiting order.

## 2020-06-10 ENCOUNTER — TELEPHONE (OUTPATIENT)
Dept: FAMILY MEDICINE CLINIC | Facility: CLINIC | Age: 80
End: 2020-06-10

## 2020-06-10 NOTE — TELEPHONE ENCOUNTER
Contacted Mariana at [facility].  Requested he fax an SIDDHARTH for us to fax requested records. Verbalized understanding. Awaiting SIDDHARTH.

## 2020-06-10 NOTE — TELEPHONE ENCOUNTER
[address] with [address] called to request any chart notes referencing the patient's sleep study before 01/10/2019. Patient moved and will be continuing care with them.     Fax#: [phone]

## 2020-06-22 ENCOUNTER — OFFICE VISIT (OUTPATIENT)
Dept: CARDIOLOGY | Age: 80
End: 2020-06-22
Payer: MEDICARE

## 2020-06-22 ENCOUNTER — TELEPHONE (OUTPATIENT)
Dept: CARDIOLOGY | Age: 80
End: 2020-06-22

## 2020-06-22 VITALS
HEART RATE: 67 BPM | SYSTOLIC BLOOD PRESSURE: 130 MMHG | DIASTOLIC BLOOD PRESSURE: 72 MMHG | BODY MASS INDEX: 30.67 KG/M2 | WEIGHT: 239 LBS | HEIGHT: 74 IN

## 2020-06-22 PROCEDURE — G8417 CALC BMI ABV UP PARAM F/U: HCPCS | Performed by: INTERNAL MEDICINE

## 2020-06-22 PROCEDURE — 1123F ACP DISCUSS/DSCN MKR DOCD: CPT | Performed by: INTERNAL MEDICINE

## 2020-06-22 PROCEDURE — 1036F TOBACCO NON-USER: CPT | Performed by: INTERNAL MEDICINE

## 2020-06-22 PROCEDURE — 99213 OFFICE O/P EST LOW 20 MIN: CPT | Performed by: INTERNAL MEDICINE

## 2020-06-22 PROCEDURE — 4040F PNEUMOC VAC/ADMIN/RCVD: CPT | Performed by: INTERNAL MEDICINE

## 2020-06-22 PROCEDURE — 93000 ELECTROCARDIOGRAM COMPLETE: CPT | Performed by: INTERNAL MEDICINE

## 2020-06-22 PROCEDURE — G8427 DOCREV CUR MEDS BY ELIG CLIN: HCPCS | Performed by: INTERNAL MEDICINE

## 2020-06-22 RX ORDER — LISINOPRIL 20 MG/1
20 TABLET ORAL DAILY
Qty: 90 TABLET | Refills: 3 | Status: SHIPPED | OUTPATIENT
Start: 2020-06-22 | End: 2021-06-28

## 2020-06-22 NOTE — PROGRESS NOTES
69-year-old gentleman with a history of remote tobacco abuse, hypertension, excision of a pituitary tumor, and dyslipidemia returns for routine follow-up. Previously underwent angiographic assessment 2004 with a description of nonobstructive plaquing involving the LAD and right coronary. Restudy in 2010 which was interpreted as normal.  He has undergone 3 surgeries in address of a pituitary tumor and now is on replacement therapy including hydrochlorothiazide on which is been problematic regarding weight gain. He gets no regular exercise and with that in mind denies any change in tolerance or chest discomfort. He has some mild chronic dyspnea on exertion has been evaluated by pulmonary who now was ordered CPAP for his diagnosed obstructive sleep apnea. He has not had his lipids checked in a year. He is aware of but has not been carefully compliant to social distancing. Remainder of his review of systems is unremarkable. On exam he carries 239 pounds a 6 foot 2 inch frame. Pressure is 130/72 pulse is 67. Very pleasant elderly gentleman with a normal male balding pattern. EOMs full, sclerae and conjunctiva normal. PERRLA. Mask in place. Trachea midline with no neck masses. Assessment of internal jugular veins reveals no elevation of central venous pressure at 45 degrees. Carotid pulses normal without delay or bruit. Thyroid normal to palpation. Chest exam reveals normal respiratory effort, no abnormal breath sounds and normal expiratory phase. No skin lesions seen. PMI normal. S1, S2 normal without murmur or spencer or click. Obese abdomen. Normal bowel sounds without palpable mass or bruit. No clubbing or acrocyanosis. No significant lower extremity edema or signs of venous insufficiency. General motor strength appears to be within normal limits. Normal range of motion with normal gait. Alert, oriented x 3, memory and cognition normal as reflected by history and conversation.   EKG reveals sinus

## 2020-06-25 ENCOUNTER — TELEPHONE (OUTPATIENT)
Dept: PULMONOLOGY | Facility: CLINIC | Age: 80
End: 2020-06-25

## 2020-06-25 DIAGNOSIS — G47.33 OBSTRUCTIVE SLEEP APNEA: Primary | ICD-10-CM

## 2020-07-29 ENCOUNTER — APPOINTMENT (OUTPATIENT)
Dept: SLEEP MEDICINE | Facility: HOSPITAL | Age: 80
End: 2020-07-29

## 2020-08-12 ENCOUNTER — HOSPITAL ENCOUNTER (OUTPATIENT)
Dept: SLEEP MEDICINE | Facility: HOSPITAL | Age: 80
Discharge: HOME | End: 2020-08-12
Admitting: NURSE PRACTITIONER

## 2020-08-12 DIAGNOSIS — G47.33 OBSTRUCTIVE SLEEP APNEA: ICD-10-CM

## 2020-08-12 PROCEDURE — 95800 SLP STDY UNATTENDED: CPT

## 2020-08-12 PROCEDURE — 95800 SLP STDY UNATTENDED: CPT | Performed by: PSYCHIATRY & NEUROLOGY

## 2020-08-14 PROCEDURE — 95800 SLP STDY UNATTENDED: CPT | Performed by: PSYCHIATRY & NEUROLOGY

## 2020-08-17 DIAGNOSIS — G47.33 OBSTRUCTIVE SLEEP APNEA: Primary | ICD-10-CM

## 2020-10-16 ENCOUNTER — HOSPITAL ENCOUNTER (OUTPATIENT)
Dept: SLEEP MEDICINE | Facility: HOSPITAL | Age: 80
Discharge: HOME | End: 2020-10-16
Admitting: NURSE PRACTITIONER

## 2020-10-16 VITALS
HEIGHT: 74 IN | RESPIRATION RATE: 18 BRPM | BODY MASS INDEX: 30.8 KG/M2 | SYSTOLIC BLOOD PRESSURE: 129 MMHG | WEIGHT: 240 LBS | OXYGEN SATURATION: 96 % | HEART RATE: 83 BPM | DIASTOLIC BLOOD PRESSURE: 67 MMHG

## 2020-10-16 DIAGNOSIS — G47.33 OBSTRUCTIVE SLEEP APNEA: ICD-10-CM

## 2020-10-16 PROCEDURE — 95810 POLYSOM 6/> YRS 4/> PARAM: CPT

## 2020-10-19 PROCEDURE — 95810 POLYSOM 6/> YRS 4/> PARAM: CPT | Performed by: PSYCHIATRY & NEUROLOGY

## 2020-10-19 NOTE — PROGRESS NOTES
Test results discussed with patient.  Patient voiced understanding.  He will discuss with Dr Peres office when they call.

## 2020-10-21 ENCOUNTER — TELEPHONE (OUTPATIENT)
Dept: SLEEP MEDICINE | Facility: HOSPITAL | Age: 80
End: 2020-10-21

## 2020-10-26 NOTE — PROGRESS NOTES
CHRISTOPHE Handley  [facility]   Respiratory Disease Clinic  [address] [place] [address]  Phone: [phone]  Fax: [phone]     John Irby is a 80 y.o. male.   : 1940  CC:   Chief Complaint   Patient presents with   • Breathing Problem      HPI: John Irby is a pleasant 80 y.o. male. The patient is here today for follow up of shortness of breath.  Patient with small airway disease and normal FEV1, lung nodule in RUL- 2 cm, stable between  and 2018, obstructive sleep apnea, obesity, and GERD.  The patient uses Breo and albuterol rescue inhaler.  The patient complains of chronic shortness of breath with exertion.  He denies fevers, chills, cough with purulent sputum.  The patient did complete sleep study and has confirmed obstructive sleep apnea.  He is still not received his Pap device.  It appears that the patient had not chosen a DME for his Pap device to be sent to.  The patient now chooses [facility].  The patient's PCP is Jennifer Melo MD.    The following portions of the patient's history were reviewed and updated as appropriate: allergies, current medications, past family history, past medical history, past social history, past surgical history and problem list.  Past Medical History:   Diagnosis Date   • Arthritis    • Cancer (CMS/HCC)    • Hypertension      Family History   Problem Relation Age of Onset   • Hypertension Mother    • No Known Problems Father    • No Known Problems Sister    • No Known Problems Brother    • No Known Problems Maternal Aunt    • No Known Problems Maternal Uncle    • No Known Problems Paternal Aunt    • No Known Problems Paternal Uncle    • No Known Problems Maternal Grandmother    • No Known Problems Maternal Grandfather    • No Known Problems Paternal Grandmother    • No Known Problems Paternal Grandfather    • No Known Problems Other    • Asthma Neg Hx    • Cancer Neg Hx    • Diabetes  "Neg Hx    • Emphysema Neg Hx    • Heart failure Neg Hx      Social History     Socioeconomic History   • Marital status:      Spouse name: Not on file   • Number of children: Not on file   • Years of education: Not on file   • Highest education level: Not on file   Tobacco Use   • Smoking status: Former Smoker     Packs/day: 0.50     Types: Cigarettes     Start date:      Quit date:      Years since quittin.8   • Smokeless tobacco: Never Used   Substance and Sexual Activity   • Alcohol use: Yes     Comment: social drinker; 4-15 drinks a week.    • Drug use: No   • Sexual activity: Defer     Review of Systems   Constitutional: Negative for chills and fever.   HENT: Negative for congestion.    Eyes: Negative for blurred vision.   Respiratory: Positive for shortness of breath ( Chronic). Negative for cough.    Cardiovascular: Negative for chest pain.   Gastrointestinal: Negative for diarrhea, nausea and vomiting.   Endocrine: Negative for cold intolerance and heat intolerance.   Genitourinary: Negative for dysuria.   Musculoskeletal: Negative for arthralgias.   Skin: Negative for rash.   Neurological: Negative for dizziness, weakness and light-headedness.   Hematological: Does not bruise/bleed easily.   Psychiatric/Behavioral: Negative for agitation. The patient is not nervous/anxious.      /80 (BP Location: Left arm, Patient Position: Sitting, Cuff Size: Adult)   Pulse 69   Temp 98.1 °F (36.7 °C) (Infrared)   Ht 188 cm (74\")   Wt 110 kg (242 lb)   SpO2 96%   BMI 31.07 kg/m²   Physical Exam  Vitals signs and nursing note reviewed.   Constitutional:       General: He is not in acute distress.     Appearance: He is well-developed. He is obese.      Interventions: Nasal cannula in place.   HENT:      Head: Normocephalic and atraumatic.   Eyes:      General: No scleral icterus.     Conjunctiva/sclera: Conjunctivae normal.      Pupils: Pupils are equal, round, and reactive to light.   Neck: "      Musculoskeletal: Normal range of motion and neck supple.   Cardiovascular:      Rate and Rhythm: Normal rate and regular rhythm.      Heart sounds: Normal heart sounds. No murmur. No friction rub.   Pulmonary:      Effort: Pulmonary effort is normal. No respiratory distress.      Breath sounds: Normal breath sounds. No wheezing or rales.   Abdominal:      General: Bowel sounds are normal. There is no distension.      Palpations: Abdomen is soft.      Tenderness: There is no abdominal tenderness.   Musculoskeletal: Normal range of motion.   Skin:     General: Skin is warm and dry.   Neurological:      Mental Status: He is alert and oriented to person, place, and time.   Psychiatric:         Behavior: Behavior normal.         Thought Content: Thought content normal.         Judgment: Judgment normal.       Pulmonary Functions Testing Results:  PFT Values        Some values may be hidden. Unless noted otherwise, only the newest values recorded on each date are displayed.         Old Values PFT Results 11/4/19   No data to display.      Pre Drug PFT Results 11/4/19      FEV1 94   FEF 25-75% 75   FEV1/FVC 73.19      Post Drug PFT Results 11/4/19   No data to display.      Other Tests PFT Results 11/4/19   No data to display.              My PFT Interpretation: None to review today    Imaging: None to review today    Assessment and Plan:   Diagnoses and all orders for this visit:    1. Small airways disease (Primary)  Continue Breo and albuterol rescue inhaler.  Discussed again with the patient that shortness of breath can likely be secondary to small airway disease, anxiety, obesity/deconditioning.  The patient's wife states that the patient has a lot of anxiety.  2. Obstructive sleep apnea  The patient has known obstructive sleep apnea.  We are currently waiting for him to decide on [facility] company for Pap device.  The patient chose [facility].    3. Lung nodule  Met benign criteria  4. Obesity (BMI  30-39.9)  Defer to PCP  5. Gastroesophageal reflux disease, unspecified whether esophagitis present  Continue Prilosec    Health maintenance:   Influenza vaccine: Yes  Pneumovax 23: Yes  Prevnar 13: Yes  Patient's Body mass index is 31.07 kg/m². BMI is above normal parameters. Recommendations include: Defer to PCP.    Follow up: 6 months with flow-volume loop  CHRISTOPHE Handley  10/27/2020  17:11 CDT    Please note that portions of this note were completed with a voice recognition program.

## 2020-10-27 ENCOUNTER — TELEPHONE (OUTPATIENT)
Dept: PULMONOLOGY | Facility: CLINIC | Age: 80
End: 2020-10-27

## 2020-10-27 ENCOUNTER — OFFICE VISIT (OUTPATIENT)
Dept: PULMONOLOGY | Facility: CLINIC | Age: 80
End: 2020-10-27

## 2020-10-27 VITALS
TEMPERATURE: 98.1 F | BODY MASS INDEX: 31.06 KG/M2 | WEIGHT: 242 LBS | SYSTOLIC BLOOD PRESSURE: 136 MMHG | HEART RATE: 69 BPM | DIASTOLIC BLOOD PRESSURE: 80 MMHG | HEIGHT: 74 IN | OXYGEN SATURATION: 96 %

## 2020-10-27 DIAGNOSIS — K21.9 GASTROESOPHAGEAL REFLUX DISEASE, UNSPECIFIED WHETHER ESOPHAGITIS PRESENT: ICD-10-CM

## 2020-10-27 DIAGNOSIS — E66.9 OBESITY (BMI 30-39.9): ICD-10-CM

## 2020-10-27 DIAGNOSIS — G47.33 OBSTRUCTIVE SLEEP APNEA: ICD-10-CM

## 2020-10-27 DIAGNOSIS — J98.4 SMALL AIRWAYS DISEASE: Primary | ICD-10-CM

## 2020-10-27 DIAGNOSIS — R91.1 LUNG NODULE: ICD-10-CM

## 2020-10-27 PROCEDURE — 99214 OFFICE O/P EST MOD 30 MIN: CPT | Performed by: NURSE PRACTITIONER

## 2020-11-17 ENCOUNTER — OFFICE VISIT (OUTPATIENT)
Dept: CARDIOLOGY | Age: 80
End: 2020-11-17
Payer: MEDICARE

## 2020-11-17 VITALS
SYSTOLIC BLOOD PRESSURE: 102 MMHG | WEIGHT: 243 LBS | HEIGHT: 74 IN | DIASTOLIC BLOOD PRESSURE: 68 MMHG | BODY MASS INDEX: 31.18 KG/M2 | HEART RATE: 69 BPM

## 2020-11-17 PROCEDURE — 99213 OFFICE O/P EST LOW 20 MIN: CPT | Performed by: INTERNAL MEDICINE

## 2020-11-17 PROCEDURE — G8417 CALC BMI ABV UP PARAM F/U: HCPCS | Performed by: INTERNAL MEDICINE

## 2020-11-17 PROCEDURE — 4040F PNEUMOC VAC/ADMIN/RCVD: CPT | Performed by: INTERNAL MEDICINE

## 2020-11-17 PROCEDURE — 1036F TOBACCO NON-USER: CPT | Performed by: INTERNAL MEDICINE

## 2020-11-17 PROCEDURE — 1123F ACP DISCUSS/DSCN MKR DOCD: CPT | Performed by: INTERNAL MEDICINE

## 2020-11-17 PROCEDURE — G8484 FLU IMMUNIZE NO ADMIN: HCPCS | Performed by: INTERNAL MEDICINE

## 2020-11-17 PROCEDURE — 93000 ELECTROCARDIOGRAM COMPLETE: CPT | Performed by: INTERNAL MEDICINE

## 2020-11-17 PROCEDURE — G8427 DOCREV CUR MEDS BY ELIG CLIN: HCPCS | Performed by: INTERNAL MEDICINE

## 2020-11-17 RX ORDER — LEVOTHYROXINE SODIUM 88 UG/1
88 TABLET ORAL DAILY
COMMUNITY

## 2020-11-17 NOTE — PROGRESS NOTES
, Hypertension, sleep apnea, and remote tobacco abuse returns for routine follow-up visit. Underwent angiographic assessment in 2004 in 2010 with reported mild disease at the time of the first study and a normal description of his coronaries on the second. His medical history been dominated by 3 surgical interventions in address of a pituitary tumor. Because of somewhat resistant hypertension sleep study was obtained which confirmed the presence of obstructive sleep apnea though insurance problems have delayed his CPAP order. At the time of his last visit in June we increased his lisinopril to 20 which she is tolerated with a good blood pressure response. He denies chest discomfort or symptoms suggestive of dysrhythmia but does have chronic/stable dyspnea on exertion. Pulmonary function testing from about a year ago showed a normal FVC and normal FEV1. He tells me he has been careful in the pandemic, wearing a mask and limiting his exposure to others. On exam he carries 243 pounds in a 6 foot 2 inch frame. Pressure is 102/68 pulse of 69. Very pleasant gentleman with a normal male balding pattern. EOMs full, sclerae and conjunctiva normal. PERRLA. Mask in place. Trachea midline with no neck masses. Assessment of internal jugular veins reveals no elevation of central venous pressure at 45 degrees. Carotid pulses normal without delay or bruit. Thyroid normal to palpation. Chest exam reveals normal respiratory effort, no abnormal breath sounds and normal expiratory phase. No skin lesions seen. PMI normal. S1, S2 normal without murmur or spencer or click. Normal bowel sounds without palpable mass or bruit. No clubbing or acrocyanosis. No significant lower extremity edema or signs of venous insufficiency. General motor strength appears to be within normal limits. Normal range of motion with normal gait. Alert, oriented x 3, memory and cognition normal as reflected by history and conversation.   EKG reveals a sinus mechanism with a first-degree AV block [228 ms] and a right ventricular conduction delay. Assessment/plan:  1. Hypertension -well-controlled at present. Continue same. 2.  Dyslipidemia -October values , HDL 64, triglycerides 67. LDL was 91 in July. Dietary indiscretion admitted. 3.  Pandemic response -appropriate    Medical records reviewed prior to today's clinic visit including visually reviewing recent diagnostic studies such as ECHOs, labs, and angiograms as well as reading previous encounter notes. More than 15 minutes spent face-to-face with patient in evaluating, and carefully explaining problems and the planned approach and the reasons behind the decisions.

## 2020-11-24 ENCOUNTER — TELEPHONE (OUTPATIENT)
Dept: PULMONOLOGY | Facility: CLINIC | Age: 80
End: 2020-11-24

## 2021-01-18 DIAGNOSIS — J98.4 SMALL AIRWAYS DISEASE: Primary | ICD-10-CM

## 2021-01-18 NOTE — TELEPHONE ENCOUNTER
Pharmacy sent a request for refills on Breo. Patient was last seen on 10/27/20 and has a return appointment on 5/4/21.

## 2021-03-05 ENCOUNTER — HOSPITAL ENCOUNTER (EMERGENCY)
Age: 81
Discharge: HOME | End: 2021-03-05
Attending: EMERGENCY MEDICINE

## 2021-03-05 ENCOUNTER — APPOINTMENT (OUTPATIENT)
Dept: CT IMAGING | Age: 81
End: 2021-03-05
Attending: EMERGENCY MEDICINE

## 2021-03-05 ENCOUNTER — APPOINTMENT (OUTPATIENT)
Dept: GENERAL RADIOLOGY | Age: 81
End: 2021-03-05
Attending: EMERGENCY MEDICINE

## 2021-03-05 VITALS
BODY MASS INDEX: 30.02 KG/M2 | HEIGHT: 74 IN | SYSTOLIC BLOOD PRESSURE: 104 MMHG | WEIGHT: 233.91 LBS | HEART RATE: 70 BPM | OXYGEN SATURATION: 94 % | TEMPERATURE: 97.5 F | DIASTOLIC BLOOD PRESSURE: 70 MMHG | RESPIRATION RATE: 9 BRPM

## 2021-03-05 DIAGNOSIS — R91.1 PULMONARY NODULE: ICD-10-CM

## 2021-03-05 DIAGNOSIS — J44.9 CHRONIC OBSTRUCTIVE PULMONARY DISEASE, UNSPECIFIED COPD TYPE (CMD): ICD-10-CM

## 2021-03-05 DIAGNOSIS — R06.00 DYSPNEA, UNSPECIFIED TYPE: Primary | ICD-10-CM

## 2021-03-05 LAB
ALBUMIN SERPL-MCNC: 3.8 G/DL (ref 3.6–5.1)
ALBUMIN/GLOB SERPL: 1.3 {RATIO} (ref 1–2.4)
ALP SERPL-CCNC: 52 UNITS/L (ref 45–117)
ALT SERPL-CCNC: 31 UNITS/L
ANION GAP SERPL CALC-SCNC: 10 MMOL/L (ref 10–20)
APTT PPP: 25 SEC (ref 22–30)
AST SERPL-CCNC: 21 UNITS/L
ATRIAL RATE (BPM): 70
BASOPHILS # BLD: 0.1 K/MCL (ref 0–0.3)
BASOPHILS NFR BLD: 1 %
BILIRUB SERPL-MCNC: 0.5 MG/DL (ref 0.2–1)
BUN SERPL-MCNC: 26 MG/DL (ref 6–20)
BUN/CREAT SERPL: 13 (ref 7–25)
CALCIUM SERPL-MCNC: 8.6 MG/DL (ref 8.4–10.2)
CHLORIDE SERPL-SCNC: 108 MMOL/L (ref 98–107)
CO2 SERPL-SCNC: 26 MMOL/L (ref 21–32)
CREAT SERPL-MCNC: 1.97 MG/DL (ref 0.67–1.17)
D DIMER PPP FEU-MCNC: 0.45 MG/L (FEU)
DEPRECATED RDW RBC: 46 FL (ref 39–50)
EOSINOPHIL # BLD: 0.1 K/MCL (ref 0–0.5)
EOSINOPHIL NFR BLD: 2 %
ERYTHROCYTE [DISTWIDTH] IN BLOOD: 13.5 % (ref 11–15)
FASTING DURATION TIME PATIENT: ABNORMAL H
GFR SERPLBLD BASED ON 1.73 SQ M-ARVRAT: 31 ML/MIN/1.73M2
GLOBULIN SER-MCNC: 2.9 G/DL (ref 2–4)
GLUCOSE SERPL-MCNC: 92 MG/DL (ref 65–99)
HCT VFR BLD CALC: 43.1 % (ref 39–51)
HGB BLD-MCNC: 14.4 G/DL (ref 13–17)
IMM GRANULOCYTES # BLD AUTO: 0 K/MCL (ref 0–0.2)
IMM GRANULOCYTES # BLD: 0 %
INR PPP: 1.1
LYMPHOCYTES # BLD: 2.2 K/MCL (ref 1–4)
LYMPHOCYTES NFR BLD: 28 %
MAGNESIUM SERPL-MCNC: 2.2 MG/DL (ref 1.7–2.4)
MCH RBC QN AUTO: 30.9 PG (ref 26–34)
MCHC RBC AUTO-ENTMCNC: 33.4 G/DL (ref 32–36.5)
MCV RBC AUTO: 92.5 FL (ref 78–100)
MONOCYTES # BLD: 0.8 K/MCL (ref 0.3–0.9)
MONOCYTES NFR BLD: 10 %
NEUTROPHILS # BLD: 4.6 K/MCL (ref 1.8–7.7)
NEUTROPHILS NFR BLD: 59 %
NRBC BLD MANUAL-RTO: 0 /100 WBC
NT-PROBNP SERPL-MCNC: 75 PG/ML
P AXIS (DEGREES): 69
PLATELET # BLD AUTO: 219 K/MCL (ref 140–450)
POTASSIUM SERPL-SCNC: 3.9 MMOL/L (ref 3.4–5.1)
PR-INTERVAL (MSEC): 250
PROT SERPL-MCNC: 6.7 G/DL (ref 6.4–8.2)
PROTHROMBIN TIME: 11.2 SEC (ref 9.7–11.8)
QRS-INTERVAL (MSEC): 84
QT-INTERVAL (MSEC): 400
QTC: 432
R AXIS (DEGREES): 18
RAINBOW EXTRA TUBES HOLD SPECIMEN: NORMAL
RBC # BLD: 4.66 MIL/MCL (ref 4.5–5.9)
REPORT TEXT: NORMAL
SODIUM SERPL-SCNC: 140 MMOL/L (ref 135–145)
T AXIS (DEGREES): 65
TROPONIN I SERPL HS-MCNC: <0.02 NG/ML
VENTRICULAR RATE EKG/MIN (BPM): 70
WBC # BLD: 7.9 K/MCL (ref 4.2–11)

## 2021-03-05 PROCEDURE — 71045 X-RAY EXAM CHEST 1 VIEW: CPT

## 2021-03-05 PROCEDURE — 85610 PROTHROMBIN TIME: CPT | Performed by: EMERGENCY MEDICINE

## 2021-03-05 PROCEDURE — 80053 COMPREHEN METABOLIC PANEL: CPT | Performed by: EMERGENCY MEDICINE

## 2021-03-05 PROCEDURE — 83880 ASSAY OF NATRIURETIC PEPTIDE: CPT | Performed by: EMERGENCY MEDICINE

## 2021-03-05 PROCEDURE — 85025 COMPLETE CBC W/AUTO DIFF WBC: CPT | Performed by: EMERGENCY MEDICINE

## 2021-03-05 PROCEDURE — 85730 THROMBOPLASTIN TIME PARTIAL: CPT | Performed by: EMERGENCY MEDICINE

## 2021-03-05 PROCEDURE — 85379 FIBRIN DEGRADATION QUANT: CPT | Performed by: EMERGENCY MEDICINE

## 2021-03-05 PROCEDURE — 71250 CT THORAX DX C-: CPT

## 2021-03-05 PROCEDURE — 84484 ASSAY OF TROPONIN QUANT: CPT | Performed by: EMERGENCY MEDICINE

## 2021-03-05 PROCEDURE — 36415 COLL VENOUS BLD VENIPUNCTURE: CPT

## 2021-03-05 PROCEDURE — 99285 EMERGENCY DEPT VISIT HI MDM: CPT

## 2021-03-05 PROCEDURE — 93005 ELECTROCARDIOGRAM TRACING: CPT | Performed by: EMERGENCY MEDICINE

## 2021-03-05 PROCEDURE — 83735 ASSAY OF MAGNESIUM: CPT | Performed by: EMERGENCY MEDICINE

## 2021-03-05 RX ORDER — SIMVASTATIN 40 MG
40 TABLET ORAL NIGHTLY
COMMUNITY
Start: 2019-08-19

## 2021-03-05 RX ORDER — HYDROCORTISONE 10 MG/1
TABLET ORAL
COMMUNITY
Start: 2019-02-16

## 2021-03-05 RX ORDER — LEVOTHYROXINE SODIUM 88 UG/1
88 TABLET ORAL DAILY
COMMUNITY
Start: 2020-05-20

## 2021-03-05 RX ORDER — LISINOPRIL 20 MG/1
20 TABLET ORAL DAILY
COMMUNITY
Start: 2020-06-22

## 2021-03-05 RX ORDER — FLUTICASONE FUROATE AND VILANTEROL TRIFENATATE 200; 25 UG/1; UG/1
POWDER RESPIRATORY (INHALATION)
COMMUNITY
Start: 2017-10-09

## 2021-03-05 RX ORDER — OMEPRAZOLE 20 MG/1
20 CAPSULE, DELAYED RELEASE ORAL DAILY
COMMUNITY
Start: 2018-09-04

## 2021-03-05 RX ORDER — MULTIVITAMIN
1 TABLET ORAL DAILY
COMMUNITY

## 2021-03-05 ASSESSMENT — ENCOUNTER SYMPTOMS
ADENOPATHY: 0
DIZZINESS: 0
WOUND: 0
ABDOMINAL PAIN: 0
FEVER: 0
BACK PAIN: 0
COUGH: 0
EYE REDNESS: 0

## 2021-03-15 ENCOUNTER — OFFICE VISIT (OUTPATIENT)
Dept: NEUROLOGY | Facility: CLINIC | Age: 81
End: 2021-03-15

## 2021-03-15 VITALS
RESPIRATION RATE: 18 BRPM | WEIGHT: 242 LBS | DIASTOLIC BLOOD PRESSURE: 62 MMHG | HEIGHT: 74 IN | HEART RATE: 70 BPM | BODY MASS INDEX: 31.06 KG/M2 | SYSTOLIC BLOOD PRESSURE: 120 MMHG

## 2021-03-15 DIAGNOSIS — G47.33 OBSTRUCTIVE SLEEP APNEA ON CPAP: Primary | ICD-10-CM

## 2021-03-15 DIAGNOSIS — Z99.89 OBSTRUCTIVE SLEEP APNEA ON CPAP: Primary | ICD-10-CM

## 2021-03-15 DIAGNOSIS — R68.2 DRY MOUTH: ICD-10-CM

## 2021-03-15 DIAGNOSIS — J98.4 SMALL AIRWAYS DISEASE: ICD-10-CM

## 2021-03-15 PROCEDURE — 99213 OFFICE O/P EST LOW 20 MIN: CPT | Performed by: NURSE PRACTITIONER

## 2021-03-15 NOTE — PATIENT INSTRUCTIONS
Continue to use CPAP nightly for at least 4 hours per night.  Let me know of any concerns that may arise.    Sleep Apnea  Sleep apnea affects breathing during sleep. It causes breathing to stop for a short time or to become shallow. It can also increase the risk of:  · Heart attack.  · Stroke.  · Being very overweight (obese).  · Diabetes.  · Heart failure.  · Irregular heartbeat.  The goal of treatment is to help you breathe normally again.  What are the causes?  There are three kinds of sleep apnea:  · Obstructive sleep apnea. This is caused by a blocked or collapsed airway.  · Central sleep apnea. This happens when the brain does not send the right signals to the muscles that control breathing.  · Mixed sleep apnea. This is a combination of obstructive and central sleep apnea.  The most common cause of this condition is a collapsed or blocked airway. This can happen if:  · Your throat muscles are too relaxed.  · Your tongue and tonsils are too large.  · You are overweight.  · Your airway is too small.  What increases the risk?  · Being overweight.  · Smoking.  · Having a small airway.  · Being older.  · Being male.  · Drinking alcohol.  · Taking medicines to calm yourself (sedatives or tranquilizers).  · Having family members with the condition.  What are the signs or symptoms?  · Trouble staying asleep.  · Being sleepy or tired during the day.  · Getting angry a lot.  · Loud snoring.  · Headaches in the morning.  · Not being able to focus your mind (concentrate).  · Forgetting things.  · Less interest in sex.  · Mood swings.  · Personality changes.  · Feelings of sadness (depression).  · Waking up a lot during the night to pee (urinate).  · Dry mouth.  · Sore throat.  How is this diagnosed?  · Your medical history.  · A physical exam.  · A test that is done when you are sleeping (sleep study). The test is most often done in a sleep lab but may also be done at home.  How is this treated?    · Sleeping on your  side.  · Using a medicine to get rid of mucus in your nose (decongestant).  · Avoiding the use of alcohol, medicines to help you relax, or certain pain medicines (narcotics).  · Losing weight, if needed.  · Changing your diet.  · Not smoking.  · Using a machine to open your airway while you sleep, such as:  ? An oral appliance. This is a mouthpiece that shifts your lower jaw forward.  ? A CPAP device. This device blows air through a mask when you breathe out (exhale).  ? An EPAP device. This has valves that you put in each nostril.  ? A BPAP device. This device blows air through a mask when you breathe in (inhale) and breathe out.  · Having surgery if other treatments do not work.  It is important to get treatment for sleep apnea. Without treatment, it can lead to:  · High blood pressure.  · Coronary artery disease.  · In men, not being able to have an erection (impotence).  · Reduced thinking ability.  Follow these instructions at home:  Lifestyle  · Make changes that your doctor recommends.  · Eat a healthy diet.  · Lose weight if needed.  · Avoid alcohol, medicines to help you relax, and some pain medicines.  · Do not use any products that contain nicotine or tobacco, such as cigarettes, e-cigarettes, and chewing tobacco. If you need help quitting, ask your doctor.  General instructions  · Take over-the-counter and prescription medicines only as told by your doctor.  · If you were given a machine to use while you sleep, use it only as told by your doctor.  · If you are having surgery, make sure to tell your doctor you have sleep apnea. You may need to bring your device with you.  · Keep all follow-up visits as told by your doctor. This is important.  Contact a doctor if:  · The machine that you were given to use during sleep bothers you or does not seem to be working.  · You do not get better.  · You get worse.  Get help right away if:  · Your chest hurts.  · You have trouble breathing in enough air.  · You have  an uncomfortable feeling in your back, arms, or stomach.  · You have trouble talking.  · One side of your body feels weak.  · A part of your face is hanging down.  These symptoms may be an emergency. Do not wait to see if the symptoms will go away. Get medical help right away. Call your local emergency services (911 in the [place] Do not drive yourself to the hospital.  Summary  · This condition affects breathing during sleep.  · The most common cause is a collapsed or blocked airway.  · The goal of treatment is to help you breathe normally while you sleep.  This information is not intended to replace advice given to you by your health care provider. Make sure you discuss any questions you have with your health care provider.  Document Revised: 10/04/2019 Document Reviewed: 08/13/2019  Elsevier Patient Education © 2020 [employer]

## 2021-03-15 NOTE — PROGRESS NOTES
"  Neurology Progress Note      Chief Complaint:    Obstructive sleep apnea    Subjective     Subjective:  John Irby is an 80-year-old male who presents to the office today for an obstructive sleep apnea follow-up with compliance review.  At this time, the patient states he is confused as to why he is using his CPAP machine as it has not helped his symptoms at all.  Upon asking what symptoms he was having, he stated \"the shortness of breath I get when I am doing activities.\"  At this current time he states that he has no excessive daytime sleepiness and reports generally feeling well rested in the morning.  He states that he will take a nap every once in a while; however, he states that usually this is simply because he wants to take a nap versus the excessive need to fall asleep.  He reports no snoring, sudden awakenings with gasping during the night, or excessive leg jerking.  He does report that he is sleepy during the day and on occasion but generally reports that his sleep helps him rest well.  At this time he is generally curious as to the reason why he is on his CPAP machine and what its purpose is if his symptoms have not improved.  The only complaint he has with his CPAP machine is that he has a dry mouth when he wakes up in the morning.      Past Medical History:   Diagnosis Date   • Arthritis    • Cancer (CMS/HCC)    • Hypertension      Past Surgical History:   Procedure Laterality Date   • COLONOSCOPY       Family History   Problem Relation Age of Onset   • Hypertension Mother    • No Known Problems Father    • No Known Problems Sister    • No Known Problems Brother    • No Known Problems Maternal Aunt    • No Known Problems Maternal Uncle    • No Known Problems Paternal Aunt    • No Known Problems Paternal Uncle    • No Known Problems Maternal Grandmother    • No Known Problems Maternal Grandfather    • No Known Problems Paternal Grandmother    • No Known Problems Paternal Grandfather    • No " Known Problems Other    • Asthma Neg Hx    • Cancer Neg Hx    • Diabetes Neg Hx    • Emphysema Neg Hx    • Heart failure Neg Hx      Social History     Tobacco Use   • Smoking status: Former Smoker     Packs/day: 0.50     Types: Cigarettes     Start date:      Quit date:      Years since quittin.2   • Smokeless tobacco: Never Used   Substance Use Topics   • Alcohol use: Yes     Comment: social drinker; 4-15 drinks a week.    • Drug use: No       Medications:  Current Outpatient Medications   Medication Sig Dispense Refill   • albuterol sulfate  (90 Base) MCG/ACT inhaler Inhale 2 puffs Every 4 (Four) Hours As Needed for Wheezing.     • Breo Ellipta 200-25 MCG/INH inhaler USE 1 INHALATION DAILY 180 each 3   • econazole nitrate (SPECTAZOLE) 1 % cream 1 application 2 (two) times a day.     • hydrocortisone (CORTEF) 10 MG tablet 20 mg in AM and 10 mg in PM     • levothyroxine (SYNTHROID, LEVOTHROID) 50 MCG tablet Take 88 mcg by mouth Daily.     • lisinopril (PRINIVIL,ZESTRIL) 10 MG tablet Take 10 mg by mouth Daily.     • Multiple Vitamin (MULTIVITAMINS PO) Take  by mouth.     • omeprazole (priLOSEC) 20 MG capsule Take 20 mg by mouth Daily.     • sertraline (ZOLOFT) 50 MG tablet Take 50 mg by mouth Daily.     • simvastatin (ZOCOR) 40 MG tablet Take 40 mg by mouth Every Night.       No current facility-administered medications for this visit.     Current outpatient and discharge medications have been reconciled for the patient.  Reviewed by: CHRISTOPHE Jeronimo      Allergies:    Patient has no known allergies.    Review of Systems:   Review of Systems   Respiratory: Positive for shortness of breath.         With exertion   Psychiatric/Behavioral: Negative for sleep disturbance.         Objective      Vital Signs  Heart Rate:  [70] 70  Resp:  [18] 18  BP: (120)/(62) 120/62    Physical Exam:  Physical Exam  Constitutional:       Appearance: Normal appearance.   HENT:      Head: Normocephalic.   Eyes:       Extraocular Movements: Extraocular movements intact.      Pupils: Pupils are equal, round, and reactive to light.   Cardiovascular:      Rate and Rhythm: Normal rate and regular rhythm.      Pulses: Normal pulses.   Pulmonary:      Effort: Pulmonary effort is normal. No tachypnea, accessory muscle usage or respiratory distress.      Breath sounds: Normal breath sounds. No decreased breath sounds or wheezing.   Musculoskeletal:         General: Normal range of motion.      Cervical back: Normal range of motion and neck supple.   Skin:     General: Skin is warm and dry.      Capillary Refill: Capillary refill takes less than 2 seconds.   Neurological:      General: No focal deficit present.      Mental Status: He is alert and oriented to person, place, and time. Mental status is at baseline.      Cranial Nerves: Cranial nerves are intact.      Sensory: Sensation is intact.      Motor: Motor function is intact.      Coordination: Coordination is intact.      Gait: Gait is intact.      Deep Tendon Reflexes: Reflexes are normal and symmetric.      Reflex Scores:       Tricep reflexes are 2+ on the right side and 2+ on the left side.       Bicep reflexes are 2+ on the right side and 2+ on the left side.       Brachioradialis reflexes are 2+ on the right side and 2+ on the left side.       Patellar reflexes are 2+ on the right side and 2+ on the left side.       Achilles reflexes are 2+ on the right side and 2+ on the left side.  Psychiatric:         Mood and Affect: Mood normal.         Neck Circumference: 18.5  Mallampati Classification: 3    Fall risk score was done on the inpatient setting.       Results Review:      [place] Sleepiness Scale: 8    STOP-BANG: Intermediate risk of obstructive sleep apnea    Compliance Report:  His report is for 2/10/2021-3/11/121.  The patient is using his device for 24 out of 30 days for an 80% compliance.  Of those he is using the machine for greater than 4 hours for 18 days for  60% compliance.  After the days that he use the device is average time was 4 hours and 59 minutes.  He is set to APAP with a minimum pressure setting of 8 cm H2O and a max pressure setting of 16 cm H2O.  His median pressure setting was 9.3 cm H2O.  His AHI is currently elevated at 9.3 with 6.7 central events.    Home sleep study from 8/13/2020:    Polysomnography from 10/17/2020:      Chart Review:  There are no neurology notes for review at this time.    Office note from 5/28/2020 with CHRISTOPHE Green reviewed.  This office visit was for shortness of breath and a previous history of ALTHEA was mentioned by the patient.  This is what prompted orders to evaluate obstructive sleep apnea.  At that time an overnight sleep oximetry study was ordered.  Subsequently a home sleep study another in-lab study was ordered for the patient.    Assessment/Plan     Impression:  1. Obstructive sleep apnea  2. Moderate compliance with CPAP  3. BMI 31.1  4. Shortness of breath upon exertion.  5. Dry mouth in the morning      Plan:  1. I have reviewed the current compliance download and findings of the previous polysomnography with the patient in detail.  The patient voices understanding and recognizes the need for adherence to the prescribed therapy.  He understands that a certain level of compliance allows for continued insurance coverage as well as adequate treatment of underlying apnea.  He also understands the impact this has upon their overall health status and other medical comorbidities.  2. AHI for this compliance report is currently 9.3.  Patient does have some compliance issues with his CPAP related to dry mouth in the morning.  I will pull compliance in 30 more days to evaluate.  3. Informed the patient that he can have his humidification settings changed to evaluate its effect on dry mouth in the morning.  Discussed appropriate intake of water to help with this as well.  4. I have recommended regular cardiovascular  exercise in the form of walking, biking or swimming 30-40 minutes at a time at least 3-4 times per week.  5. Counseled on multimodal approach to treatment of sleep apnea to include but not limited to diet, exercise, sleep hygiene, compliance with pap therapy.   6. Encouraged lateral sleeping position and to avoid sedatives or alcohol close to bedtime.   7. Risks of untreated sleep apnea were discussed to include but not limited to HTN, heart disease, stroke, cardiac arrhythmia such as AFIB, and dementia.  8. I discussed with the patient the reason for using CPAP machine.  I explained to him what obstructive sleep apnea was and its treatment options including CPAP therapy.  Patient was under the understanding that he had a sleep study ordered directly related to his shortness of breath.  However, upon chart review I found that he mentioned he was diagnosed with sleep apnea in the past from somewhere in [place].  This is what prompted the sleep study.  I explained to him that, using his CPAP may not directly have an effect on his shortness of breath with exertion.  I explained to him that he has a small airway disease which is very likely causing his shortness of breath.  The treatment of his sleep apnea is important in preventing the progression various diseases, but it may not directly improve his symptoms in this case.  I explained to him that as long as sleep apnea symptoms were not present then CPAP therapy is appropriate.  9. The plan of care was explained with the patient and he is in full agreement at this time.    10. Return in about 1 year (around 3/15/2022) for Obstructive sleep apnea follow-up, Annual compliance review.    John Irby  reports that he quit smoking about 61 years ago. His smoking use included cigarettes. He started smoking about 63 years ago. He smoked 0.50 packs per day. He has never used smokeless tobacco. I advised him to continue not to smoke.               Isaiah BRIDGES  CHRISTOPHE Pool  03/15/21  10:47 CDT

## 2021-04-16 ENCOUNTER — DOCUMENTATION (OUTPATIENT)
Dept: NEUROLOGY | Facility: CLINIC | Age: 81
End: 2021-04-16

## 2021-05-13 PROBLEM — G47.33 OBSTRUCTIVE SLEEP APNEA: Chronic | Status: ACTIVE | Noted: 2020-05-28

## 2021-05-13 PROBLEM — R91.1 LUNG NODULE: Chronic | Status: ACTIVE | Noted: 2020-05-28

## 2021-05-13 PROBLEM — K21.9 GASTROESOPHAGEAL REFLUX DISEASE: Chronic | Status: ACTIVE | Noted: 2020-10-27

## 2021-05-13 PROBLEM — J98.4 SMALL AIRWAYS DISEASE: Chronic | Status: ACTIVE | Noted: 2020-05-28

## 2021-05-13 NOTE — PROGRESS NOTES
" CHRISTOPHE Handley  [facility]   Respiratory Disease Clinic  [address] [place] [address]  Phone: [phone]  Fax: [phone]       Chief Complaint  Shortness of Breath and small airways disease    Subjective    History of Present Illness    John Irby presents to [facility] PULMONARY & CRITICAL CARE MEDICINE   History of Present Illness   Patient with small airway disease and normal FEV1, lung nodule in RUL- 2 cm, stable between 2016 and 4/2018, obstructive sleep apnea, obesity, and GERD.  The patient uses Breo and albuterol rescue inhaler.    The patient was evaluated in the emergency at an outlying facility on March 5, 2021.  The patient was found to have a 2.3 cm nodule in the right upper lobe, with alternating soft tissue, rim   calcification and rim soft tissue. The appearance is suggestive of a   calcified granuloma. There is evidence of old granulomatous disease within  the chest. However, this nodule is larger on the current radiographs, compared to 2018. Given this finding, follow-up with PET/CT is recommended.   The patient was agreeable to undergo a follow-up short-term CT of the chest.  Order was placed for this today.  He denies fevers, chills, cough with purulent sputum.  Objective   Vital Signs:   /62   Pulse 79   Ht 188 cm (74\")   Wt 107 kg (236 lb 12.8 oz)   SpO2 99%   BMI 30.40 kg/m²     Physical Exam  Vitals reviewed.   Constitutional:       General: He is not in acute distress.     Appearance: He is well-developed and overweight.      Interventions: Face mask in place.   HENT:      Head: Normocephalic and atraumatic.   Eyes:      General: No scleral icterus.     Conjunctiva/sclera: Conjunctivae normal.      Pupils: Pupils are equal, round, and reactive to light.   Cardiovascular:      Rate and Rhythm: Normal rate.   Pulmonary:      Effort: Pulmonary effort is normal. No respiratory distress.      Breath sounds: Normal " breath sounds. No wheezing or rales.   Musculoskeletal:         General: Normal range of motion.      Cervical back: Normal range of motion and neck supple.   Skin:     General: Skin is warm and dry.   Neurological:      Mental Status: He is alert and oriented to person, place, and time.   Psychiatric:         Behavior: Behavior normal.         Thought Content: Thought content normal.         Judgment: Judgment normal.        Result Review :  The following data was reviewed by: CHRISTOPHE Handley on 05/14/2021:  Pavel, [facility] Incoming Radiant Results And Orders - 03/05/2021  3:38 PM CST   CT CHEST WITHOUT CONTRAST DATED 3/5/2021     CLINICAL INDICATION: Nodule. Shortness of breath.     COMPARISON: Chest radiographs 3/5/2021 and 1/30/2018     TECHNIQUE: Helical CT of the chest was performed without the administration   of intravenous contrast.  Coronal and sagittal reformats were obtained.   Axial and coronal maximum intensity projection images were also created at   the CT console and utilized for interpretation.  Iterative reconstruction   technique was utilized as radiation dose reduction strategy in this   patient.     FINDINGS:      Small calcified mediastinal lymph nodes are present including small   calcified right hilar lymph node, compatible with old granulomatous   disease. No noncalcified enlarged mediastinal lymph node is identified.   Cardiac size is within normal limits. Coronary artery calcifications are   present. No pericardial effusion is identified. No pleural effusion is   identified.     There is no axillary or cardiophrenic angle lymphadenopathy. Esophagus is   not dilated.     In the right upper lobe, there is a 2.3 cm round nodule. There is   circumferential rim calcification. There is also a thin rim of surrounding   soft tissue. There is adjacent linear scarring which extends to the pleural   surface.     Mild scarring is present at the lung apices. Small 2-3 mm subpleural   nodules are  present. There is also a small calcified granuloma posteriorly   in the right upper lobe (series 301, image 32). Other small calcified   granulomas are present in the right upper lobe.     There is mild dependent bibasilar atelectasis. There are small 2-3 mm   nodules in the left upper lobe (series 301, image 27 and 29). There is a 4   mm groundglass nodule in the left upper lobe subpleural (series 301, image   52). There is mild atelectasis or scarring at the lung bases, left greater   than right. No lung mass is identified. Central airways are clear. There is   no pneumothorax.     Images of the upper abdomen demonstrate diffuse decreased density,   compatible with hepatic steatosis. There is cholelithiasis. Images of the   upper abdomen otherwise unremarkable for noncontrast technique.     There are degenerative changes involving the thoracic spine.     IMPRESSION:     1. 2.3 cm nodule in the right upper lobe, with alternating soft tissue, rim   calcification and rim soft tissue. The appearance is suggestive of a   calcified granuloma. There is evidence of old granulomatous disease within   the chest. However, this nodule is larger on the current radiographs,   compared to 2018. Given this finding, follow-up with PET/CT is recommended.     2. Scattered lucencies within the lungs, suggesting COPD.     3. Other small scattered small pulmonary nodules measuring 3 to 4 mm. Given   presence of COPD, consider follow-up in one year.     4. Hepatic steatosis.     5. Cholelithiasis.     Electronically Signed by: PETEY SHIN M.D.   Signed on: 3/5/2021 3:35 PM       PFT Values        Some values may be hidden. Unless noted otherwise, only the newest values recorded on each date are displayed.         Old Values PFT Results 11/4/19   No data to display.      Pre Drug PFT Results 11/4/19      FEV1 94   FEF 25-75% 75   FEV1/FVC 73.19      Post Drug PFT Results 11/4/19   No data to display.      Other Tests PFT  Results 11/4/19   No data to display.                    Assessment and Plan  Diagnoses and all orders for this visit:    1. Small airways disease (Primary)  Comments:  Continue Breo and albuterol HFA.    2. Obstructive sleep apnea  Comments:  Continue noninvasive positive pressure ventilation device.  The patient is benefiting from it and wishes to continue it.    3. Lung nodule  Comments:  Known 2cm nodule is slightly larger at 2.3cm on CT chest from March 5, 2021 compared to 2018 imaging.  Follow-up CT of the chest in 1 month.  Order was placed.  Orders:  -     CT Chest Without Contrast; Future    4. Gastroesophageal reflux disease, unspecified whether esophagitis present  Comments:  Continue Prilosec.    5. Anxiety disorder, unspecified type  Comments:  Recommend follow-up with his PCP.  May need to consider adding BuSpar to the patient's current anxiety regimen.      Health maintenance:   Influenza vaccine: yes  Pneumovax 23: yes  Prevnar 13: no   Covid 19: yes   Follow Up  CHRISTOPHE Handley  5/14/2021  14:18 CDT  Return in about 6 months (around 11/14/2021).  Patient was given instructions and counseling regarding his condition or for health maintenance advice. Please see specific information pulled into the AVS if appropriate.   Please note that portions of this note were completed with a voice recognition program.

## 2021-05-14 ENCOUNTER — OFFICE VISIT (OUTPATIENT)
Dept: PULMONOLOGY | Facility: CLINIC | Age: 81
End: 2021-05-14

## 2021-05-14 VITALS
BODY MASS INDEX: 30.39 KG/M2 | DIASTOLIC BLOOD PRESSURE: 62 MMHG | HEIGHT: 74 IN | HEART RATE: 79 BPM | OXYGEN SATURATION: 99 % | SYSTOLIC BLOOD PRESSURE: 122 MMHG | WEIGHT: 236.8 LBS

## 2021-05-14 DIAGNOSIS — K21.9 GASTROESOPHAGEAL REFLUX DISEASE, UNSPECIFIED WHETHER ESOPHAGITIS PRESENT: Chronic | ICD-10-CM

## 2021-05-14 DIAGNOSIS — R91.1 LUNG NODULE: Chronic | ICD-10-CM

## 2021-05-14 DIAGNOSIS — F41.9 ANXIETY DISORDER, UNSPECIFIED TYPE: ICD-10-CM

## 2021-05-14 DIAGNOSIS — J98.4 SMALL AIRWAYS DISEASE: Primary | Chronic | ICD-10-CM

## 2021-05-14 DIAGNOSIS — G47.33 OBSTRUCTIVE SLEEP APNEA: Chronic | ICD-10-CM

## 2021-05-14 PROCEDURE — 99214 OFFICE O/P EST MOD 30 MIN: CPT | Performed by: NURSE PRACTITIONER

## 2021-05-17 ENCOUNTER — TELEPHONE (OUTPATIENT)
Dept: PULMONOLOGY | Facility: CLINIC | Age: 81
End: 2021-05-17

## 2021-05-17 NOTE — TELEPHONE ENCOUNTER
Left message for patient to call back regarding referral appt status at [facility].

## 2021-05-18 NOTE — TELEPHONE ENCOUNTER
Patient informed of Chest CT Scan on 06/14/21 at 10:00am at [facility].  Requested disk be given to patient.

## 2021-06-14 DIAGNOSIS — R91.1 LUNG NODULE: Chronic | ICD-10-CM

## 2021-06-26 DIAGNOSIS — I10 ESSENTIAL HYPERTENSION: ICD-10-CM

## 2021-06-28 RX ORDER — LISINOPRIL 20 MG/1
TABLET ORAL
Qty: 90 TABLET | Refills: 3 | Status: SHIPPED | OUTPATIENT
Start: 2021-06-28 | End: 2022-06-30

## 2021-12-29 ENCOUNTER — APPOINTMENT (OUTPATIENT)
Dept: GENERAL RADIOLOGY | Age: 81
End: 2021-12-29
Attending: EMERGENCY MEDICINE

## 2021-12-29 ENCOUNTER — HOSPITAL ENCOUNTER (EMERGENCY)
Age: 81
Discharge: HOME | End: 2021-12-29
Attending: EMERGENCY MEDICINE

## 2021-12-29 VITALS
DIASTOLIC BLOOD PRESSURE: 67 MMHG | RESPIRATION RATE: 15 BRPM | HEART RATE: 77 BPM | SYSTOLIC BLOOD PRESSURE: 124 MMHG | OXYGEN SATURATION: 94 % | TEMPERATURE: 98.8 F

## 2021-12-29 DIAGNOSIS — Z20.822 COVID-19 VIRUS TEST RESULT UNKNOWN: ICD-10-CM

## 2021-12-29 DIAGNOSIS — Z20.822 EXPOSURE TO COVID-19 VIRUS: ICD-10-CM

## 2021-12-29 DIAGNOSIS — R05.9 COUGH: Primary | ICD-10-CM

## 2021-12-29 PROBLEM — M17.11 PRIMARY OSTEOARTHRITIS OF RIGHT KNEE: Status: ACTIVE | Noted: 2017-12-12

## 2021-12-29 PROBLEM — C61 MALIGNANT NEOPLASM OF PROSTATE (CMD): Status: ACTIVE | Noted: 2021-12-29

## 2021-12-29 PROBLEM — I95.1 ORTHOSTASIS: Status: ACTIVE | Noted: 2019-02-10

## 2021-12-29 PROBLEM — I10 ESSENTIAL HYPERTENSION, BENIGN: Status: ACTIVE | Noted: 2018-07-11

## 2021-12-29 PROBLEM — D35.2 PITUITARY MACROADENOMA (CMD): Status: ACTIVE | Noted: 2019-02-10

## 2021-12-29 PROBLEM — D49.7 TUMOR, THYROID: Status: ACTIVE | Noted: 2021-12-29

## 2021-12-29 PROBLEM — F41.9 ANXIETY DISORDER: Status: ACTIVE | Noted: 2018-07-11

## 2021-12-29 PROBLEM — E23.0: Status: ACTIVE | Noted: 2019-02-10

## 2021-12-29 PROBLEM — I25.10 ATHEROSCLEROTIC CORONARY VASCULAR DISEASE: Status: ACTIVE | Noted: 2018-07-11

## 2021-12-29 PROBLEM — R91.1 LUNG NODULE: Status: ACTIVE | Noted: 2018-07-19

## 2021-12-29 PROBLEM — Z98.890 HX OF CARDIAC CATH: Status: ACTIVE | Noted: 2018-07-19

## 2021-12-29 PROBLEM — D49.7 PITUITARY TUMOR: Status: ACTIVE | Noted: 2018-07-11

## 2021-12-29 PROBLEM — E27.40 ADRENAL INSUFFICIENCY (CMD): Status: ACTIVE | Noted: 2019-03-03

## 2021-12-29 PROBLEM — E23.0 PANHYPOPITUITARISM (CMD): Status: ACTIVE | Noted: 2019-03-03

## 2021-12-29 PROBLEM — E29.1 HYPOGONADISM, MALE: Status: ACTIVE | Noted: 2019-02-10

## 2021-12-29 PROBLEM — K21.9 GASTROESOPHAGEAL REFLUX DISEASE: Status: ACTIVE | Noted: 2020-10-27

## 2021-12-29 PROBLEM — C61 PROSTATE CARCINOMA (CMD): Status: ACTIVE | Noted: 2018-07-11

## 2021-12-29 PROBLEM — I51.7 MILD CONCENTRIC LEFT VENTRICULAR HYPERTROPHY (LVH): Status: ACTIVE | Noted: 2019-03-25

## 2021-12-29 PROBLEM — I10 HYPERTENSION: Status: ACTIVE | Noted: 2021-12-29

## 2021-12-29 PROBLEM — M81.0 SENILE OSTEOPOROSIS: Status: ACTIVE | Noted: 2021-02-07

## 2021-12-29 PROBLEM — E78.5 DYSLIPIDEMIA: Status: ACTIVE | Noted: 2018-07-11

## 2021-12-29 PROBLEM — E66.9 OBESITY (BMI 30-39.9): Status: ACTIVE | Noted: 2020-10-27

## 2021-12-29 PROBLEM — G47.33 OBSTRUCTIVE SLEEP APNEA: Status: ACTIVE | Noted: 2020-05-28

## 2021-12-29 PROBLEM — R63.4 LOSS OF WEIGHT: Status: ACTIVE | Noted: 2019-02-10

## 2021-12-29 PROBLEM — J98.4 SMALL AIRWAYS DISEASE: Status: ACTIVE | Noted: 2020-05-28

## 2021-12-29 PROBLEM — E03.9 HYPOTHYROIDISM: Status: ACTIVE | Noted: 2018-07-11

## 2021-12-29 PROBLEM — E78.00 PURE HYPERCHOLESTEROLEMIA: Status: ACTIVE | Noted: 2019-12-08

## 2021-12-29 PROBLEM — N20.9 URINARY TRACT STONES: Status: ACTIVE | Noted: 2018-07-11

## 2021-12-29 PROBLEM — I49.3 PVC (PREMATURE VENTRICULAR CONTRACTION): Status: ACTIVE | Noted: 2018-07-19

## 2021-12-29 PROBLEM — E66.3 OVERWEIGHT: Status: ACTIVE | Noted: 2020-05-28

## 2021-12-29 PROBLEM — Z87.891 HISTORY OF TOBACCO ABUSE: Status: ACTIVE | Noted: 2018-07-19

## 2021-12-29 LAB
ALBUMIN SERPL-MCNC: 3.4 G/DL (ref 3.6–5.1)
ALBUMIN/GLOB SERPL: 1.1 {RATIO} (ref 1–2.4)
ALP SERPL-CCNC: 47 UNITS/L (ref 45–117)
ALT SERPL-CCNC: 50 UNITS/L
ANION GAP SERPL CALC-SCNC: 9 MMOL/L (ref 10–20)
AST SERPL-CCNC: 46 UNITS/L
BASOPHILS # BLD: 0 K/MCL (ref 0–0.3)
BASOPHILS NFR BLD: 0 %
BILIRUB SERPL-MCNC: 0.4 MG/DL (ref 0.2–1)
BUN SERPL-MCNC: 18 MG/DL (ref 6–20)
BUN/CREAT SERPL: 10 (ref 7–25)
CALCIUM SERPL-MCNC: 9.1 MG/DL (ref 8.4–10.2)
CHLORIDE SERPL-SCNC: 106 MMOL/L (ref 98–107)
CO2 SERPL-SCNC: 25 MMOL/L (ref 21–32)
CREAT SERPL-MCNC: 1.78 MG/DL (ref 0.67–1.17)
DEPRECATED RDW RBC: 47.2 FL (ref 39–50)
EOSINOPHIL # BLD: 0 K/MCL (ref 0–0.5)
EOSINOPHIL NFR BLD: 0 %
ERYTHROCYTE [DISTWIDTH] IN BLOOD: 13.5 % (ref 11–15)
FASTING DURATION TIME PATIENT: ABNORMAL H
GFR SERPLBLD BASED ON 1.73 SQ M-ARVRAT: 35 ML/MIN
GLOBULIN SER-MCNC: 3 G/DL (ref 2–4)
GLUCOSE SERPL-MCNC: 124 MG/DL (ref 70–99)
HCT VFR BLD CALC: 38.8 % (ref 39–51)
HGB BLD-MCNC: 12.9 G/DL (ref 13–17)
IMM GRANULOCYTES # BLD AUTO: 0 K/MCL (ref 0–0.2)
IMM GRANULOCYTES # BLD: 0 %
LYMPHOCYTES # BLD: 0.6 K/MCL (ref 1–4)
LYMPHOCYTES NFR BLD: 9 %
MCH RBC QN AUTO: 31.5 PG (ref 26–34)
MCHC RBC AUTO-ENTMCNC: 33.2 G/DL (ref 32–36.5)
MCV RBC AUTO: 94.6 FL (ref 78–100)
MONOCYTES # BLD: 0.7 K/MCL (ref 0.3–0.9)
MONOCYTES NFR BLD: 11 %
NEUTROPHILS # BLD: 4.8 K/MCL (ref 1.8–7.7)
NEUTROPHILS NFR BLD: 80 %
NRBC BLD MANUAL-RTO: 0 /100 WBC
PLATELET # BLD AUTO: 148 K/MCL (ref 140–450)
POTASSIUM SERPL-SCNC: 4.3 MMOL/L (ref 3.4–5.1)
PROT SERPL-MCNC: 6.4 G/DL (ref 6.4–8.2)
RBC # BLD: 4.1 MIL/MCL (ref 4.5–5.9)
SODIUM SERPL-SCNC: 136 MMOL/L (ref 135–145)
WBC # BLD: 6.1 K/MCL (ref 4.2–11)

## 2021-12-29 PROCEDURE — 71045 X-RAY EXAM CHEST 1 VIEW: CPT

## 2021-12-29 PROCEDURE — 36415 COLL VENOUS BLD VENIPUNCTURE: CPT

## 2021-12-29 PROCEDURE — 93005 ELECTROCARDIOGRAM TRACING: CPT | Performed by: EMERGENCY MEDICINE

## 2021-12-29 PROCEDURE — U0003 INFECTIOUS AGENT DETECTION BY NUCLEIC ACID (DNA OR RNA); SEVERE ACUTE RESPIRATORY SYNDROME CORONAVIRUS 2 (SARS-COV-2) (CORONAVIRUS DISEASE [COVID-19]), AMPLIFIED PROBE TECHNIQUE, MAKING USE OF HIGH THROUGHPUT TECHNOLOGIES AS DESCRIBED BY CMS-2020-01-R: HCPCS | Performed by: EMERGENCY MEDICINE

## 2021-12-29 PROCEDURE — 99283 EMERGENCY DEPT VISIT LOW MDM: CPT

## 2021-12-29 PROCEDURE — 80053 COMPREHEN METABOLIC PANEL: CPT | Performed by: EMERGENCY MEDICINE

## 2021-12-29 PROCEDURE — 85025 COMPLETE CBC W/AUTO DIFF WBC: CPT | Performed by: EMERGENCY MEDICINE

## 2021-12-29 PROCEDURE — C9803 HOPD COVID-19 SPEC COLLECT: HCPCS

## 2021-12-29 RX ORDER — SERTRALINE HYDROCHLORIDE 100 MG/1
1 TABLET, FILM COATED ORAL DAILY
COMMUNITY
Start: 2021-12-03

## 2021-12-29 ASSESSMENT — ENCOUNTER SYMPTOMS
FATIGUE: 1
SHORTNESS OF BREATH: 1
HEMATOLOGIC/LYMPHATIC NEGATIVE: 1
PSYCHIATRIC NEGATIVE: 1
COUGH: 1
FEVER: 0
NEUROLOGICAL NEGATIVE: 1
ALLERGIC/IMMUNOLOGIC NEGATIVE: 1

## 2021-12-29 ASSESSMENT — PAIN SCALES - GENERAL: PAINLEVEL_OUTOF10: 0

## 2021-12-30 LAB
ATRIAL RATE (BPM): 77
P AXIS (DEGREES): 83
PR-INTERVAL (MSEC): 240
QRS-INTERVAL (MSEC): 88
QT-INTERVAL (MSEC): 402
QTC: 455
R AXIS (DEGREES): 34
RAINBOW EXTRA TUBES HOLD SPECIMEN: NORMAL
REPORT TEXT: NORMAL
SARS-COV-2 RNA RESP QL NAA+PROBE: DETECTED
SERVICE CMNT-IMP: ABNORMAL
T AXIS (DEGREES): 68
VENTRICULAR RATE EKG/MIN (BPM): 77

## 2022-03-01 DIAGNOSIS — J98.4 SMALL AIRWAYS DISEASE: ICD-10-CM

## 2022-03-01 NOTE — TELEPHONE ENCOUNTER
Pharmacy sent a request for refills on Breo.   Rx Refill Note  Requested Prescriptions     Pending Prescriptions Disp Refills   • Breo Ellipta 200-25 MCG/INH inhaler [Pharmacy Med Name: BREO ELLIPTA INH 30 DOSES 200/25MCG] 360 each 3     Sig: USE 1 INHALATION DAILY      Last office visit with prescribing clinician: Visit date not found      Next office visit with prescribing clinician: Visit date not found            Tomer Chaparro CMA  03/01/22, 13:06 CST

## 2022-03-28 NOTE — PROGRESS NOTES
" Rahel Treviño, MSN, APRN, NP-C, YAHIR, CFRN  [facility]   Pulmonary and Critical Care  [address]            [place] [address]  Phone: [phone]  Fax: [phone]          Chief Complaint  small airways disease    Subjective    History of Present Illness    John Irby presents to [facility] PULMONARY & CRITICAL CARE MEDICINE   History of Present Illness   The patient presents today for follow-up of small airway disease.  Patient with small airway disease and normal FEV1, lung nodule in RUL- 2 cm, stable between 10/17 and 6/21 per [facility], obstructive sleep apnea, obesity, and GERD.  The patient uses Breo-200 and albuterol rescue inhaler.  The patient reports chronic shortness of breath with exertion.  He also has problems with his anxiety which then hinders his shortness of breath.  I recommend the patient to speak with his primary care provider regarding possibly adding BuSpar to his anxiety regimen.  He reports that he is not using his CPAP regularly and has not used it in the last couple months.  He states that the he now feels like he can't breath when he lays down in bed.  Discussed resuming CPAP with patient.  He is hesitant, but he start using it during naps.  He states it makes his mouth very dry despite humidification and that is why he does not like it.  He is agreeable to obtain an ON pulse off CPAP to see if he would qualify for nocturnal O2.  He is aware that O2 alone is not treatment for ALTHEA.  Patient denies fevers, chills, cough with purulent sputum.  Objective   Vital Signs:   /78   Pulse 68   Ht 188 cm (74\")   Wt 106 kg (233 lb)   SpO2 96% Comment: RA  BMI 29.92 kg/m²     Physical Exam  Vitals reviewed.   Constitutional:       General: He is not in acute distress.     Appearance: He is well-developed and overweight.      Interventions: Face mask in place.   HENT:      Head: Normocephalic and atraumatic.   Eyes:      " General: No scleral icterus.     Conjunctiva/sclera: Conjunctivae normal.      Pupils: Pupils are equal, round, and reactive to light.   Cardiovascular:      Rate and Rhythm: Normal rate.   Pulmonary:      Effort: Pulmonary effort is normal. No respiratory distress.      Breath sounds: Normal breath sounds. No wheezing or rales.   Musculoskeletal:         General: Normal range of motion.      Cervical back: Normal range of motion and neck supple.   Skin:     General: Skin is warm and dry.   Neurological:      Mental Status: He is alert and oriented to person, place, and time.   Psychiatric:         Behavior: Behavior normal.         Thought Content: Thought content normal.         Judgment: Judgment normal.        Result Review :  The following data was reviewed by: CHRISTOPHE Handley on 03/29/2022:        Results for orders placed in visit on 11/04/19    Pulmonary Function Test    Narrative  Pulmonary Function Test  Performed by: Jatinder Mcginnis MD  Authorized by: Jatinder Mcginnis MD    Pre Drug  FVC: 100%  FEV1: 94%  FEF 25-75%: 75%  FEV1/FVC: 73.19%           Assessment and Plan  Diagnoses and all orders for this visit:    1. Small airways disease (Primary)  Comments:  Continue Breo and albuterol HFA as needed    2. Obstructive sleep apnea  Comments:  Recommend resuming noninvasive positive pressure ventilation.  Will obtain overnight on room air to see if he qualifies for nocturnal oxygen.  The patient may wish to utilize nocturnal oxygen only.  He was educated that nocturnal oxygen alone is not treatment for obstructive sleep apnea.  He states that he understands, but he is unsure if he is going to be able to continue to tolerate NIPPV.  Orders:  -     Overnight Sleep Oximetry Study; Future    3. Lung nodule  Comments:  10/2017 and 6/2021 per [facility] co imaging    4. Gastroesophageal reflux disease, unspecified whether esophagitis present  Comments:  Continue Prilosec.    5.  Overweight  Comments:  Recommend weight loss.    6. Anxiety  Comments:  Recommend that he discuss this further with his PCP.  Could consider adding BuSpar to his current treatment regimen.  Again, will defer to his PCP.      Follow Up  Rahel Treviño, APRN  3/29/2022  14:01 CDT  Return in about 6 months (around 9/29/2022) for FVL+DLCO.  Patient was given instructions and counseling regarding his condition or for health maintenance advice. Please see specific information pulled into the AVS if appropriate.   Please note that portions of this note were completed with a voice recognition program.

## 2022-03-29 ENCOUNTER — OFFICE VISIT (OUTPATIENT)
Dept: PULMONOLOGY | Facility: CLINIC | Age: 82
End: 2022-03-29

## 2022-03-29 VITALS
HEART RATE: 68 BPM | SYSTOLIC BLOOD PRESSURE: 122 MMHG | HEIGHT: 74 IN | DIASTOLIC BLOOD PRESSURE: 78 MMHG | BODY MASS INDEX: 29.9 KG/M2 | OXYGEN SATURATION: 96 % | WEIGHT: 233 LBS

## 2022-03-29 DIAGNOSIS — E66.3 OVERWEIGHT: ICD-10-CM

## 2022-03-29 DIAGNOSIS — J98.4 SMALL AIRWAYS DISEASE: Primary | Chronic | ICD-10-CM

## 2022-03-29 DIAGNOSIS — R91.1 LUNG NODULE: Chronic | ICD-10-CM

## 2022-03-29 DIAGNOSIS — K21.9 GASTROESOPHAGEAL REFLUX DISEASE, UNSPECIFIED WHETHER ESOPHAGITIS PRESENT: Chronic | ICD-10-CM

## 2022-03-29 DIAGNOSIS — G47.33 OBSTRUCTIVE SLEEP APNEA: ICD-10-CM

## 2022-03-29 DIAGNOSIS — F41.9 ANXIETY: ICD-10-CM

## 2022-03-29 PROCEDURE — 99214 OFFICE O/P EST MOD 30 MIN: CPT | Performed by: NURSE PRACTITIONER

## 2022-03-29 NOTE — PATIENT INSTRUCTIONS
Sleep Apnea  Sleep apnea affects breathing during sleep. It causes breathing to stop for a short time or to become shallow. It can also increase the risk of:  Heart attack.  Stroke.  Being very overweight (obese).  Diabetes.  Heart failure.  Irregular heartbeat.  The goal of treatment is to help you breathe normally again.  What are the causes?  There are three kinds of sleep apnea:  Obstructive sleep apnea. This is caused by a blocked or collapsed airway.  Central sleep apnea. This happens when the brain does not send the right signals to the muscles that control breathing.  Mixed sleep apnea. This is a combination of obstructive and central sleep apnea.  The most common cause of this condition is a collapsed or blocked airway. This can happen if:  Your throat muscles are too relaxed.  Your tongue and tonsils are too large.  You are overweight.  Your airway is too small.  What increases the risk?  Being overweight.  Smoking.  Having a small airway.  Being older.  Being male.  Drinking alcohol.  Taking medicines to calm yourself (sedatives or tranquilizers).  Having family members with the condition.  What are the signs or symptoms?  Trouble staying asleep.  Being sleepy or tired during the day.  Getting angry a lot.  Loud snoring.  Headaches in the morning.  Not being able to focus your mind (concentrate).  Forgetting things.  Less interest in sex.  Mood swings.  Personality changes.  Feelings of sadness (depression).  Waking up a lot during the night to pee (urinate).  Dry mouth.  Sore throat.  How is this diagnosed?  Your medical history.  A physical exam.  A test that is done when you are sleeping (sleep study). The test is most often done in a sleep lab but may also be done at home.  How is this treated?    Sleeping on your side.  Using a medicine to get rid of mucus in your nose (decongestant).  Avoiding the use of alcohol, medicines to help you relax, or certain pain medicines (narcotics).  Losing weight, if  needed.  Changing your diet.  Not smoking.  Using a machine to open your airway while you sleep, such as:  An oral appliance. This is a mouthpiece that shifts your lower jaw forward.  A CPAP device. This device blows air through a mask when you breathe out (exhale).  An EPAP device. This has valves that you put in each nostril.  A BPAP device. This device blows air through a mask when you breathe in (inhale) and breathe out.  Having surgery if other treatments do not work.  It is important to get treatment for sleep apnea. Without treatment, it can lead to:  High blood pressure.  Coronary artery disease.  In men, not being able to have an erection (impotence).  Reduced thinking ability.  Follow these instructions at home:  Lifestyle  Make changes that your doctor recommends.  Eat a healthy diet.  Lose weight if needed.  Avoid alcohol, medicines to help you relax, and some pain medicines.  Do not use any products that contain nicotine or tobacco, such as cigarettes, e-cigarettes, and chewing tobacco. If you need help quitting, ask your doctor.  General instructions  Take over-the-counter and prescription medicines only as told by your doctor.  If you were given a machine to use while you sleep, use it only as told by your doctor.  If you are having surgery, make sure to tell your doctor you have sleep apnea. You may need to bring your device with you.  Keep all follow-up visits as told by your doctor. This is important.  Contact a doctor if:  The machine that you were given to use during sleep bothers you or does not seem to be working.  You do not get better.  You get worse.  Get help right away if:  Your chest hurts.  You have trouble breathing in enough air.  You have an uncomfortable feeling in your back, arms, or stomach.  You have trouble talking.  One side of your body feels weak.  A part of your face is hanging down.  These symptoms may be an emergency. Do not wait to see if the symptoms will go away. Get  medical help right away. Call your local emergency services (911 in the [place] Do not drive yourself to the hospital.  Summary  This condition affects breathing during sleep.  The most common cause is a collapsed or blocked airway.  The goal of treatment is to help you breathe normally while you sleep.  This information is not intended to replace advice given to you by your health care provider. Make sure you discuss any questions you have with your health care provider.  Document Revised: 10/04/2019 Document Reviewed: 08/13/2019  Elsevier Patient Education © 2021 [employer] Inc[employer]

## 2022-04-26 ENCOUNTER — OFFICE VISIT (OUTPATIENT)
Dept: CARDIOLOGY CLINIC | Age: 82
End: 2022-04-26
Payer: MEDICARE

## 2022-04-26 VITALS
WEIGHT: 229 LBS | HEART RATE: 81 BPM | BODY MASS INDEX: 29.39 KG/M2 | DIASTOLIC BLOOD PRESSURE: 70 MMHG | HEIGHT: 74 IN | SYSTOLIC BLOOD PRESSURE: 128 MMHG | OXYGEN SATURATION: 94 %

## 2022-04-26 DIAGNOSIS — E78.2 MIXED HYPERLIPIDEMIA: ICD-10-CM

## 2022-04-26 DIAGNOSIS — I25.10 CORONARY ARTERY DISEASE INVOLVING NATIVE CORONARY ARTERY OF NATIVE HEART WITHOUT ANGINA PECTORIS: Primary | ICD-10-CM

## 2022-04-26 DIAGNOSIS — I10 ESSENTIAL HYPERTENSION: ICD-10-CM

## 2022-04-26 DIAGNOSIS — I49.3 PVC (PREMATURE VENTRICULAR CONTRACTION): ICD-10-CM

## 2022-04-26 PROCEDURE — 93000 ELECTROCARDIOGRAM COMPLETE: CPT | Performed by: NURSE PRACTITIONER

## 2022-04-26 PROCEDURE — G8427 DOCREV CUR MEDS BY ELIG CLIN: HCPCS | Performed by: NURSE PRACTITIONER

## 2022-04-26 PROCEDURE — G8417 CALC BMI ABV UP PARAM F/U: HCPCS | Performed by: NURSE PRACTITIONER

## 2022-04-26 PROCEDURE — 1036F TOBACCO NON-USER: CPT | Performed by: NURSE PRACTITIONER

## 2022-04-26 PROCEDURE — 99214 OFFICE O/P EST MOD 30 MIN: CPT | Performed by: NURSE PRACTITIONER

## 2022-04-26 PROCEDURE — 4040F PNEUMOC VAC/ADMIN/RCVD: CPT | Performed by: NURSE PRACTITIONER

## 2022-04-26 PROCEDURE — 1123F ACP DISCUSS/DSCN MKR DOCD: CPT | Performed by: NURSE PRACTITIONER

## 2022-04-26 NOTE — PROGRESS NOTES
radiation therapy     Hypertension     Prostate cancer ([facility] [address]     Sleep apnea      Past Surgical History:   Procedure Laterality Date    COLONOSCOPY      PITUITARY EXCISION       Family History   Problem Relation Age of Onset    Hypertension Mother      Social History     Tobacco Use    Smoking status: Former Smoker     Types: Cigarettes    Smokeless tobacco: Never Used   Substance Use Topics    Alcohol use: Yes     Comment: occ      Current Outpatient Medications   Medication Sig Dispense Refill    lisinopril (PRINIVIL;ZESTRIL) 20 MG tablet TAKE 1 TABLET DAILY 90 tablet 3    levothyroxine (SYNTHROID) 88 MCG tablet Take 88 mcg by mouth Daily      simvastatin (ZOCOR) 40 MG tablet TAKE 1 TABLET DAILY IN THE EVENING 90 tablet 3    hydrocortisone (CORTEF) 10 MG tablet Take 2 tablets in am and 1 tablet at night      sertraline (ZOLOFT) 50 MG tablet Take 100 mg by mouth daily       omeprazole (PRILOSEC) 20 MG delayed release capsule Take 20 mg by mouth daily       Multiple Vitamins-Minerals (THERAPEUTIC MULTIVITAMIN-MINERALS) tablet Take 1 tablet by mouth daily      Fluticasone Furoate-Vilanterol (BREO ELLIPTA) 200-25 MCG/INH AEPB Inhale 1 puff into the lungs daily       No current facility-administered medications for this visit. Allergies: Patient has no known allergies. Review of Systems  Constitutional  no appetite change, or unexpected weight change. No fever, chills or diaphoresis. No significant change in activity level or new onset of fatigue. HEENT  no significant rhinorrhea or epistaxis. No tinnitus or significant hearing loss. Eyes  no sudden vision change or amaurosis. No corneal arcus, xantholasma, subconjunctival hemorrhage or discharge. Respiratory  no significant wheezing, stridor, apnea or cough. + mild and stable RAMIRES. Cardiovascular  no exertional chest pain to suggest myocardial ischemia. No orthopnea or PND. No sensation of sustained arrythmia.   No occurrence of slow heart rate. No palpitations. No claudication. Gastrointestinal  no abdominal swelling or pain. No blood in stool. No severe constipation, diarrhea, nausea, or vomiting. Genitourinary  no dysuria, frequency, or urgency. No flank pain or hematuria. Musculoskeletal  no back pain or myalgia. No problems with gait. Extremities - no clubbing, cyanosis or extremity edema. Skin  no color change or rash. No pallor. No new surgical incision. Neurologic  no speech difficulty, facial asymmetry or lateralizing weakness. No seizures, presyncope or syncope. No significant dizziness. Hematologic  no easy bruising or excessive bleeding. Psychiatric  no severe anxiety or insomnia. No confusion. All other review of systems are negative. Objective  Vital Signs - /70   Pulse 81   Ht 6' 2\" (1.88 m)   Wt 229 lb (103.9 kg)   SpO2 94%   BMI 29.40 kg/m²   General - Ryan Pisano is alert, cooperative, and pleasant. Well groomed. No acute distress. Body habitus - Body mass index is 29.4 kg/m². HEENT  Head is normocephalic. No circumoral cyanosis. Dentition is normal.  EYES -   Lids normal without ptosis. No discharge, edema or subconjunctival hemorrhage. Neck - Symmetrical without apparent mass or lymphadenopathy. Respiratory - Normal respiratory effort without use of accessory muscles. Ausculatation reveals vesicular breath sounds without crackles, wheezes, rub or rhonchi. Cardiovascular  No jugular venous distention. Auscultation reveals regular rate and rhythm. No audible clicks, gallop or rub. No murmur. No lower extremity varicosities. No carotid bruits. Abdominal -  No visible distention, mass or pulsations. Extremities - No clubbing or cyanosis. No statis dermatitis or ulcers. No edema. Musculoskeletal -   No Osler's nodes. No kyphosis or scoliosis. Gait is even and regular without limp or shuffle. Ambulates without assistance.   Skin -  Warm and dry; no rash or pallor. No new surgical wound. Neurological - No focal neurological deficits. Thought processes coherent. No apparent tremor. Oriented to person, place and time. Psychiatric -  Appropriate affect and mood. Data reviewed:  9/6/18 SE  Stress echocardiogram without clinical, electrocardiographic, or   echocardiographic evidence of myocardial ischemia. [place] Treadmill Score was 3.0 . Test was supervised by Dr. Bob Hodges   Electronically signed by Arely Waggoner MD    8/22/18 echo   Normal left ventricular size and function EF 55-60%. Mild left ventricular   hypertrophy with normal diastolic function evidenced by E/A ratio. Left   atrium is normal size. Aortic valve probably tricuspid (poorly visualized)   - functions normally. Mitral valve appears structurally normal with trace   MR. Right sided chambers appear normal size and RV systolic function is   preserved. There is no pericardial effusion and the aortic root dimensions   are within normal limits. Electronically signed by Arely Wagogner MD      EKG today reviewed: NSR 81 bpm with 1st degree AVB; no acute ischemic changes or ectopy. BP Readings from Last 3 Encounters:   04/26/22 128/70   11/17/20 102/68   06/22/20 130/72    Pulse Readings from Last 3 Encounters:   04/26/22 81   11/17/20 69   06/22/20 67        Wt Readings from Last 3 Encounters:   04/26/22 229 lb (103.9 kg)   11/17/20 243 lb (110.2 kg)   06/22/20 239 lb (108.4 kg)     Assessment/Plan:   Diagnosis Orders   1. Coronary artery disease involving native coronary artery of native heart without angina pectoris     2. Essential hypertension     3. PVC (premature ventricular contraction)  EKG 12 lead   4. Mixed hyperlipidemia       Stable CV status without overt heart failure, sensed arrhythmia or angina. CAD - stable on current medical management. Continue same. HTN - normotensive on current regimen. BP today 128/70. Continue same. Hyperlipidemia - monitored and managed by PCP. LDL 93. Continue Zocor 40 mg daily. Patient is compliant with medication regimen. Previous cardiac history and records reviewed. Continue current medical management for cardiac related condition. Continue other current medications as directed. Continue to follow up with primary care provider for non cardiac medical problems. If your primary care provider is outside of the [facility], please request that your labs be faxed to this office at [phone]. BP goal 130/80 or less. Call the office with any problems, questions or concerns at [phone]. Cardiology follow up as scheduled in [address] appointments. Educational included in patient instructions. Heart health.      Ady Quintana, APRN

## 2022-04-26 NOTE — PATIENT INSTRUCTIONS
Signal confirmed with Debbie.    New instructions for today:    Patient Instructions:  Continue current medications as prescribed. Always keep a current medication list. Bring your medications to every office visit. Continue to follow up with primary care provider for non cardiac medical problems. Call the office with any problems, questions or concerns at [phone]. If you have been asked to keep a blood pressure log, do so for 2 weeks. Call the office to report readings to the triage nurse at [phone]. Follow up with cardiologist as scheduled. The following educational material has been included in this after visit summary for your review: Life simple 7. Heart health. Life simple 7  1) Manage blood pressure - high blood pressure is a major risk factor for heart disease and stroke. Keeping blood pressure in health range reduces strain on your heart, arteries and kidneys. Blood pressure goal is less than 130/80. 2) Control cholesterol - contributes to plaque, which can clog arteries and lead to heart disease and stroke. When you control your cholesterol you are giving your arteries their best chance to remain clear. It is recommended that you get cholesterol lab work done once a year. 3) Reduce blood sugar - most of the food we eat is turning into glucose or blood sugar that our body uses for energy. Over time, high levels of blood sugar can damage your heart, kidneys, eyes and nerves. 4) Get active - living an active life is one of the most rewarding gifts you can give yourself and those you love. Simply put, daily physical activity increases your length and quality of life. Strive to exercise 15 minutes most days of the week. 5)  Eat better - A healthy diet is one of your best weapons for fighting cardiovascular disease. When you eat a heart healthy diet, you improve your chances for feeling good and staying healthy for life.   6)  Lose weight - when you shed extra fat an unnecessary pounds, you reduce the burden on your hear, lungs, blood vessels and skeleton. You give yourself the gift of active living, you lower your blood pressure and help yourself feel better. 7) Stop smoking - cigarette smokers have a higher risk of developing cardiovascular disease. If  You smoke, quitting is the best thing you can do for your health. Check American Heart Association on line for more information on Life's Simple 7 and tips for healthy living. A Healthy Heart: Care Instructions  Your Care Instructions     Coronary artery disease, also called heart disease, occurs when a substance called plaque builds up in the vessels that supply oxygen-rich blood to your heart muscle. This can narrow the blood vessels and reduce blood flow. A heart attack happens when blood flow is completely blocked. A high-fat diet, smoking, and other factors increase the risk of heart disease. Your doctor has found that you have a chance of having heart disease. You can do lots of things to keep your heart healthy. It may not be easy, but you can change your diet, exercise more, and quit smoking. These steps really work to lower your chance of heart disease. Follow-up care is a key part of your treatment and safety. Be sure to make and go to all appointments, and call your doctor if you are having problems. It's also a good idea to know your test results and keep a list of the medicines you take. How can you care for yourself at home? Diet  · Use less salt when you cook and eat. This helps lower your blood pressure. Taste food before salting. Add only a little salt when you think you need it. With time, your taste buds will adjust to less salt. · Eat fewer snack items, fast foods, canned soups, and other high-salt, high-fat, processed foods. · Read food labels and try to avoid saturated and trans fats. They increase your risk of heart disease by raising cholesterol levels. · Limit the amount of solid fat-butter, margarine, and shortening-you eat.  Use may include:  · Chest pain or pressure, or a strange feeling in the chest.  · Sweating. · Shortness of breath. · Pain, pressure, or a strange feeling in the back, neck, jaw, or upper belly or in one or both shoulders or arms. · Lightheadedness or sudden weakness. · A fast or irregular heartbeat. After you call 911, the  may tell you to chew 1 adult-strength or 2 to 4 low-dose aspirin. Wait for an ambulance. Do not try to drive yourself. Watch closely for changes in your health, and be sure to contact your doctor if you have any problems. Where can you learn more? Go to https://[employer]. org and sign in to your [employer] account. Enter X893 in the [employer] box to learn more about \"A Healthy Heart: Care Instructions. \"     If you do not have an account, please click on the \"Sign Up Now\" link. Current as of: December 16, 2019               Content Version: 12.5  © 5575-8872 Healthwise, Incorporated. Care instructions adapted under license by [facility] ([facility]). If you have questions about a medical condition or this instruction, always ask your healthcare professional. [place] [address] any warranty or liability for your use of this information.

## 2022-06-29 DIAGNOSIS — I10 ESSENTIAL HYPERTENSION: ICD-10-CM

## 2022-06-30 RX ORDER — LISINOPRIL 20 MG/1
TABLET ORAL
Qty: 90 TABLET | Refills: 3 | Status: SHIPPED | OUTPATIENT
Start: 2022-06-30

## 2022-09-27 ENCOUNTER — TELEPHONE (OUTPATIENT)
Dept: PULMONOLOGY | Facility: CLINIC | Age: 82
End: 2022-09-27

## 2022-09-27 NOTE — TELEPHONE ENCOUNTER
Please notify the patient that I have received a prior authorization request for Miguel.  It appears that the patient is now seeing another pulmonologist.  Is he going to continue to follow with the other pulmonologist or is he going to reschedule here?  If he is going to continue with another pulmonologist I will let them continue his inhaler regimen othewise we are happy to see him.

## 2023-01-31 ENCOUNTER — APPOINTMENT (OUTPATIENT)
Dept: GENERAL RADIOLOGY | Age: 83
End: 2023-01-31
Attending: PHYSICIAN ASSISTANT

## 2023-01-31 ENCOUNTER — HOSPITAL ENCOUNTER (EMERGENCY)
Age: 83
Discharge: HOME | End: 2023-01-31
Attending: EMERGENCY MEDICINE

## 2023-01-31 VITALS
HEART RATE: 73 BPM | DIASTOLIC BLOOD PRESSURE: 91 MMHG | WEIGHT: 220 LBS | OXYGEN SATURATION: 98 % | HEIGHT: 74 IN | BODY MASS INDEX: 28.23 KG/M2 | SYSTOLIC BLOOD PRESSURE: 112 MMHG | RESPIRATION RATE: 17 BRPM | TEMPERATURE: 97.3 F

## 2023-01-31 DIAGNOSIS — R06.00 DYSPNEA, UNSPECIFIED TYPE: Primary | ICD-10-CM

## 2023-01-31 LAB
ALBUMIN SERPL-MCNC: 3.5 G/DL (ref 3.6–5.1)
ALBUMIN/GLOB SERPL: 1.3 {RATIO} (ref 1–2.4)
ALP SERPL-CCNC: 47 UNITS/L (ref 45–117)
ALT SERPL-CCNC: 38 UNITS/L
ANION GAP SERPL CALC-SCNC: 9 MMOL/L (ref 7–19)
AST SERPL-CCNC: 39 UNITS/L
BASOPHILS # BLD: 0 K/MCL (ref 0–0.3)
BASOPHILS NFR BLD: 0 %
BILIRUB SERPL-MCNC: 0.6 MG/DL (ref 0.2–1)
BUN SERPL-MCNC: 17 MG/DL (ref 6–20)
BUN/CREAT SERPL: 10 (ref 7–25)
CALCIUM SERPL-MCNC: 8.4 MG/DL (ref 8.4–10.2)
CHLORIDE SERPL-SCNC: 106 MMOL/L (ref 97–110)
CO2 SERPL-SCNC: 27 MMOL/L (ref 21–32)
CREAT SERPL-MCNC: 1.76 MG/DL (ref 0.67–1.17)
D DIMER PPP FEU-MCNC: 0.72 MG/L (FEU)
DEPRECATED RDW RBC: 43 FL (ref 39–50)
EOSINOPHIL # BLD: 0.2 K/MCL (ref 0–0.5)
EOSINOPHIL NFR BLD: 3 %
ERYTHROCYTE [DISTWIDTH] IN BLOOD: 12.6 % (ref 11–15)
FASTING DURATION TIME PATIENT: ABNORMAL H
GFR SERPLBLD BASED ON 1.73 SQ M-ARVRAT: 38 ML/MIN
GLOBULIN SER-MCNC: 2.7 G/DL (ref 2–4)
GLUCOSE SERPL-MCNC: 91 MG/DL (ref 70–99)
HCT VFR BLD CALC: 40.5 % (ref 39–51)
HGB BLD-MCNC: 13.9 G/DL (ref 13–17)
IMM GRANULOCYTES # BLD AUTO: 0 K/MCL (ref 0–0.2)
IMM GRANULOCYTES # BLD: 0 %
LYMPHOCYTES # BLD: 1.4 K/MCL (ref 1–4)
LYMPHOCYTES NFR BLD: 19 %
MAGNESIUM SERPL-MCNC: 2.1 MG/DL (ref 1.7–2.4)
MCH RBC QN AUTO: 31.9 PG (ref 26–34)
MCHC RBC AUTO-ENTMCNC: 34.3 G/DL (ref 32–36.5)
MCV RBC AUTO: 92.9 FL (ref 78–100)
MONOCYTES # BLD: 0.6 K/MCL (ref 0.3–0.9)
MONOCYTES NFR BLD: 8 %
NEUTROPHILS # BLD: 5.4 K/MCL (ref 1.8–7.7)
NEUTROPHILS NFR BLD: 70 %
NRBC BLD MANUAL-RTO: 0 /100 WBC
NT-PROBNP SERPL-MCNC: 139 PG/ML
PLATELET # BLD AUTO: 171 K/MCL (ref 140–450)
POTASSIUM SERPL-SCNC: 3.7 MMOL/L (ref 3.4–5.1)
PROT SERPL-MCNC: 6.2 G/DL (ref 6.4–8.2)
RAINBOW EXTRA TUBES HOLD SPECIMEN: NORMAL
RAINBOW EXTRA TUBES HOLD SPECIMEN: NORMAL
RBC # BLD: 4.36 MIL/MCL (ref 4.5–5.9)
SODIUM SERPL-SCNC: 138 MMOL/L (ref 135–145)
TROPONIN I SERPL DL<=0.01 NG/ML-MCNC: 8 NG/L
WBC # BLD: 7.6 K/MCL (ref 4.2–11)

## 2023-01-31 PROCEDURE — 83880 ASSAY OF NATRIURETIC PEPTIDE: CPT | Performed by: EMERGENCY MEDICINE

## 2023-01-31 PROCEDURE — 85025 COMPLETE CBC W/AUTO DIFF WBC: CPT | Performed by: PHYSICIAN ASSISTANT

## 2023-01-31 PROCEDURE — 71046 X-RAY EXAM CHEST 2 VIEWS: CPT

## 2023-01-31 PROCEDURE — 99285 EMERGENCY DEPT VISIT HI MDM: CPT

## 2023-01-31 PROCEDURE — 80053 COMPREHEN METABOLIC PANEL: CPT | Performed by: PHYSICIAN ASSISTANT

## 2023-01-31 PROCEDURE — 85379 FIBRIN DEGRADATION QUANT: CPT | Performed by: EMERGENCY MEDICINE

## 2023-01-31 PROCEDURE — 10002807 HB RX 258: Performed by: EMERGENCY MEDICINE

## 2023-01-31 PROCEDURE — 84484 ASSAY OF TROPONIN QUANT: CPT | Performed by: EMERGENCY MEDICINE

## 2023-01-31 PROCEDURE — 96360 HYDRATION IV INFUSION INIT: CPT

## 2023-01-31 PROCEDURE — 83735 ASSAY OF MAGNESIUM: CPT | Performed by: EMERGENCY MEDICINE

## 2023-01-31 PROCEDURE — 93005 ELECTROCARDIOGRAM TRACING: CPT | Performed by: EMERGENCY MEDICINE

## 2023-01-31 RX ADMIN — SODIUM CHLORIDE 500 ML: 9 INJECTION, SOLUTION INTRAVENOUS at 16:45

## 2023-01-31 ASSESSMENT — PAIN SCALES - GENERAL: PAINLEVEL_OUTOF10: 0

## 2023-01-31 ASSESSMENT — ENCOUNTER SYMPTOMS
DIARRHEA: 0
VOMITING: 0
SHORTNESS OF BREATH: 1
LIGHT-HEADEDNESS: 0
FEVER: 0
CHILLS: 0
ABDOMINAL PAIN: 0
DIZZINESS: 0
NAUSEA: 0

## 2023-02-01 LAB
ATRIAL RATE (BPM): 64
P AXIS (DEGREES): 62
PR-INTERVAL (MSEC): 260
QRS-INTERVAL (MSEC): 88
QT-INTERVAL (MSEC): 438
QTC: 452
R AXIS (DEGREES): 1
REPORT TEXT: NORMAL
T AXIS (DEGREES): 72
VENTRICULAR RATE EKG/MIN (BPM): 64

## 2024-09-16 ENCOUNTER — INITIAL APN SNF VISIT (OUTPATIENT)
Dept: INTERNAL MEDICINE CLINIC | Facility: SKILLED NURSING FACILITY | Age: 84
End: 2024-09-16

## 2024-09-16 ENCOUNTER — EXTERNAL FACILITY (OUTPATIENT)
Dept: PULMONOLOGY | Facility: CLINIC | Age: 84
End: 2024-09-16

## 2024-09-16 VITALS
TEMPERATURE: 98 F | DIASTOLIC BLOOD PRESSURE: 70 MMHG | RESPIRATION RATE: 18 BRPM | OXYGEN SATURATION: 96 % | HEART RATE: 80 BPM | SYSTOLIC BLOOD PRESSURE: 133 MMHG

## 2024-09-16 DIAGNOSIS — R42 DIZZINESS: Primary | ICD-10-CM

## 2024-09-16 DIAGNOSIS — E78.5 HYPERLIPIDEMIA, UNSPECIFIED HYPERLIPIDEMIA TYPE: ICD-10-CM

## 2024-09-16 DIAGNOSIS — J69.0 ASPIRATION PNEUMONIA, UNSPECIFIED ASPIRATION PNEUMONIA TYPE, UNSPECIFIED LATERALITY, UNSPECIFIED PART OF LUNG (HCC): ICD-10-CM

## 2024-09-16 DIAGNOSIS — I26.99 ACUTE PULMONARY EMBOLISM WITHOUT ACUTE COR PULMONALE, UNSPECIFIED PULMONARY EMBOLISM TYPE (HCC): Primary | ICD-10-CM

## 2024-09-16 DIAGNOSIS — I26.99 ACUTE PULMONARY EMBOLISM, UNSPECIFIED PULMONARY EMBOLISM TYPE, UNSPECIFIED WHETHER ACUTE COR PULMONALE PRESENT (HCC): ICD-10-CM

## 2024-09-16 DIAGNOSIS — R53.81 PHYSICAL DECONDITIONING: ICD-10-CM

## 2024-09-16 DIAGNOSIS — K80.20 CALCULUS OF GALLBLADDER WITHOUT CHOLECYSTITIS WITHOUT OBSTRUCTION: ICD-10-CM

## 2024-09-16 DIAGNOSIS — R19.7 DIARRHEA, UNSPECIFIED TYPE: ICD-10-CM

## 2024-09-16 DIAGNOSIS — M17.11 PRIMARY OSTEOARTHRITIS OF RIGHT KNEE: ICD-10-CM

## 2024-09-16 DIAGNOSIS — F32.A DEPRESSION, UNSPECIFIED DEPRESSION TYPE: ICD-10-CM

## 2024-09-16 DIAGNOSIS — N18.9 CHRONIC KIDNEY DISEASE, UNSPECIFIED CKD STAGE: ICD-10-CM

## 2024-09-16 DIAGNOSIS — I26.99 OTHER ACUTE PULMONARY EMBOLISM, UNSPECIFIED WHETHER ACUTE COR PULMONALE PRESENT (HCC): ICD-10-CM

## 2024-09-16 DIAGNOSIS — G47.33 OSA (OBSTRUCTIVE SLEEP APNEA): ICD-10-CM

## 2024-09-16 DIAGNOSIS — I10 HYPERTENSION, UNSPECIFIED TYPE: ICD-10-CM

## 2024-09-16 DIAGNOSIS — F41.9 ANXIETY: ICD-10-CM

## 2024-09-16 DIAGNOSIS — J18.9 PNEUMONIA OF BOTH LOWER LOBES DUE TO INFECTIOUS ORGANISM: ICD-10-CM

## 2024-09-16 DIAGNOSIS — Z87.898 H/O: PITUITARY TUMOR: ICD-10-CM

## 2024-09-16 DIAGNOSIS — R91.8 LUNG MASS: ICD-10-CM

## 2024-09-16 PROCEDURE — 99306 1ST NF CARE HIGH MDM 50: CPT

## 2024-09-16 PROCEDURE — 99305 1ST NF CARE MODERATE MDM 35: CPT | Performed by: PHYSICIAN ASSISTANT

## 2024-09-16 PROCEDURE — 1123F ACP DISCUSS/DSCN MKR DOCD: CPT

## 2024-09-16 NOTE — PROGRESS NOTES
Jeremy Adkins  : 1940  Age 84 year old  male patient is admitted to [facility] for rehabilitation and strengthening.      [facility] Admission in [place] 24 - 24    Reason for visit: Follow up on PE and PNA     HPI  Pt is an 84-year-old male with a pmh of hypertension, hypothyroidism, GERD and anxiety who presented to [facility] ER in [place] for right sided abdominal pain. He reported right sided lower quadrant pain with inspiration and described it as sharp with deep inspiration. Pt recently underwent stress test, Holter monitor and echocardiogram that he was told were normal. [facility] CT of abdomen was obtained which showed renal cyst and cholelithiasis with minimal gallbladder distension. He was found to have mild dehydration with mild elevated white blood count and diminished renal function. Pt was then transferred to [facility] due to elevated d-dimer and further evaluation.  At [place] CT chest revealed large PE and pneumonia. Abdominal imaging was also obtained d/t c/o vomiting and abdominal pan which revealed gallstones without obstruction or cholecystitis. Pt was treated with Lovenox for anticoagulation and IV abx. Eventually converted Lovenox to Eliquis and discharged pt to [facility] for rehab and strengthening.     Pt seen and examined for initial APRN visit. Pt was recently working with speech therapy, but shortly after was instructed to lay back down in bed after feeling dizzy while drinking water.  Pt reports feeling dizzy often and having a hard time breathing with activity. Currently denies chest pain or sob. He reports he lives part time in [place] and part time in [place],  but decided to come to [facility] rehab in [place] because his family is nearby. Pt reports feeling overall a lot better and looks forward to getting stronger in PT. He denies n/v, abdominal pain, constipation or diarrhea. He has no other issues/concerns. Vss     Past Medical  History:    Esophageal reflux    Essential hypertension    Hyperlipidemia    Hypothyroidism    Pituitary lesion (HCC)    Prostate CA (HCC)    In remission, received radiation and seeding 10 years ago     Past Surgical History:   Procedure Laterality Date    Excis pituitary,transnasal/septal      Other surgical history      radiation optic nerve     No family history on file.  Social History     Socioeconomic History    Marital status:    Tobacco Use    Smoking status: Never    Smokeless tobacco: Never   Vaping Use    Vaping status: Never Used   Substance and Sexual Activity    Alcohol use: Yes     Comment: weekly       IMMUNIZATIONS  There is no immunization history for the selected administration types on file for this patient.     ALLERGIES:  No Known Allergies    CODE STATUS:  Full Code    ADVANCED CARE PLANNING TEAM: Will need family care plan     CURRENT MEDICATIONS - reviewed and updated on SNF EMAR     SUBJECTIVE    Pt seen and examined for initial APRN visit. Pt was recently working with speech therapy, but shortly after was instructed to lay back down in bed after feeling dizzy while drinking water.  Pt reports feeling dizzy often and having a hard time breathing with activity. Currently denies chest pain or sob. He reports he lives part time in [place] and part time in [place],  but decided to come to [facility] rehab in [place] because his family is nearby. Pt reports feeling overall a lot better and looks forward to getting stronger in PT. He denies n/v, abdominal pain, constipation or diarrhea. He has no other issues/concerns. Vss     PHYSICAL EXAM:  Vitals:    09/16/24 1322   BP: 133/70   Pulse: 80   Resp: 18   Temp: 97.5 °F (36.4 °C)   SpO2: 96%      GENERAL HEALTH:well developed, well nourished, in no apparent distress   LINES, TUBES, DRAINS:  none  SKIN: no rashes, no suspicious lesions  WOUND: see wound assessment,   EYES: PERRLA, EOMI, sclera anicteric, conjunctiva normal; there is no  nystagmus, no drainage from eyes  HENT: normocephalic; normal nose, no nasal drainage, mucous membranes pink, moist, pharynx no exudate, no visible cerumen.   NECK:supple, FROM; no JVD, no TMG, no carotid bruits   BREAST: deferred exam   RESPIRATORY:diminished at the bases   CARDIOVASCULAR: S1, S2 normal, RRR; no S3, no S4  ABDOMEN:  normal active BS+, soft, nondistended; no organomegaly, no masses; no bruits; nontender, no guarding, no rebound tenderness.  :no suprapubic distension  LYMPHATIC:no lymphedema  MUSCULOSKELETAL: no acute synovitis upper or lower extremity   EXTREMITIES/VASCULAR:edema right LE and edema left LE  NEUROLOGIC: pt reports dizziness when sitting up   PSYCHIATRIC: alert and oriented x 3; affect appropriate     MEDICAL DECISION MAKING  Capable     DIAGNOSTICS REVIEWED AT THIS VISIT:  Labs 09/16/24: wbc 9.13, hgb 10.5, plt 289, gluc 81, na 142, k 4.1, bun 15, creat 1.45    SEE PLAN BELOW    Dizziness  - check orthostatic BPs x 2 days   - PT/OT   - monitor     PE  - Hospital work up in [place] revealed elevated D dimer and CT chest Large PT  - Initially treated with Lovenox, then converted to eliquis   - continue eliquis, currently 10 mg bid x 6 days, then 5 mg bid starting 09/18/24  - monitor     PNA   - Treated with IV antibiotics in hospital, then transitioned to Augmentin 875 - 125 BID x 7 days, end date 09/19/24  - Also developed a pleuritic pain which was initially treated with IV dilaudid then prn norco  - continue norco 5 q6h prn in rehab   - currently on RA in rehab   - weekly labs in rehab   - monitor     Cholelithiasis   - abdominal imaging in hospital with no obstruction or cholecystitis   - monitor     Loose stools   - pt reports improvement   - check Cdiff   - on Florastor 250 mg x 14 days while on abx   - monitor     Hypertension  - continue lisinopril 20 mg daily     HLD   - continue simvastatin 40 mg hs     Hypothyroidism  - continue levothyroxine 100 mcg daily     Chronic  Kidney Disease   - stable   - weekly labs in rehab     Pituitary Tumor   - continue hydrocortisone 20 mg bid   - monitor      Depression   Anxiety   - continue zoloft 100 mg daily     Physical Deconditioning/Impaired mobility and ADLs/At risk for falling  - Fall Precautions  - PT/OT to evaluate and treat  - [facility] team to establish care plan meeting with patient and POA/family as appropriate  - Anticipate DC on or before TBD; SW to assist patient/family w/ DC planning  - DC Plan:  TBD     Vitamins/supplements as r/t deficiencies  - [facility] RD to follow while in rehab; supplementation/diet as per [facility] RD  - May continue home supplements, cyanocobalamin 1000 mcg daily, multivitamin daily     LABS  - CBC/CMP weekly while in [facility]    FOLLOW UP APPOINTMENTS  No future appointments.     60 minutes spent w/ patient and staff, including but not limited to reviewing present status, needs, abilities with disciplines, reviewing medical records, vital signs, labs, completing med reconciliation and entering orders for continued care in [facility]      Note to patient: The 21st Century Cures Act makes medical notes like these available to patients in the interest of transparency. However, this is a medical document intended as peer to peer communication. It is written in medical language and may contain abbreviations or verbiage that are unfamiliar. It may appear blunt or direct. Medical documents are intended to carry relevant information, facts as evident, and the clinical opinion of the practitioner who signs the document.     Lindsey Koch, APRN  09/16/24

## 2024-09-17 ENCOUNTER — HOSPITAL ENCOUNTER (EMERGENCY)
Facility: HOSPITAL | Age: 84
Discharge: HOME | End: 2024-09-17
Attending: EMERGENCY MEDICINE
Payer: MEDICARE

## 2024-09-17 VITALS
HEART RATE: 82 BPM | BODY MASS INDEX: 30 KG/M2 | SYSTOLIC BLOOD PRESSURE: 147 MMHG | OXYGEN SATURATION: 95 % | TEMPERATURE: 98 F | RESPIRATION RATE: 22 BRPM | WEIGHT: 235 LBS | DIASTOLIC BLOOD PRESSURE: 73 MMHG

## 2024-09-17 DIAGNOSIS — I48.91 ATRIAL FIBRILLATION, NEW ONSET (HCC): Primary | ICD-10-CM

## 2024-09-17 PROBLEM — N18.9 CHRONIC KIDNEY DISEASE: Status: ACTIVE | Noted: 2024-09-17

## 2024-09-17 PROBLEM — Z87.898 H/O: PITUITARY TUMOR: Status: ACTIVE | Noted: 2024-09-17

## 2024-09-17 PROBLEM — R42 DIZZINESS: Status: ACTIVE | Noted: 2024-09-17

## 2024-09-17 PROBLEM — R19.7 DIARRHEA: Status: ACTIVE | Noted: 2024-09-17

## 2024-09-17 PROBLEM — I10 HYPERTENSION: Status: ACTIVE | Noted: 2024-09-17

## 2024-09-17 PROBLEM — K80.20 CALCULUS OF GALLBLADDER WITHOUT CHOLECYSTITIS WITHOUT OBSTRUCTION: Status: ACTIVE | Noted: 2024-09-17

## 2024-09-17 PROBLEM — I26.99 ACUTE PULMONARY EMBOLISM (HCC): Status: ACTIVE | Noted: 2024-09-17

## 2024-09-17 PROBLEM — F41.9 ANXIETY: Status: ACTIVE | Noted: 2024-09-17

## 2024-09-17 PROBLEM — F32.A DEPRESSION: Status: ACTIVE | Noted: 2024-09-17

## 2024-09-17 PROBLEM — E78.5 HYPERLIPIDEMIA: Status: ACTIVE | Noted: 2024-09-17

## 2024-09-17 PROBLEM — J69.0 ASPIRATION PNEUMONIA (HCC): Status: ACTIVE | Noted: 2024-09-17

## 2024-09-17 PROBLEM — R53.81 PHYSICAL DECONDITIONING: Status: ACTIVE | Noted: 2024-09-17

## 2024-09-17 LAB
ALBUMIN SERPL-MCNC: 3.6 G/DL (ref 3.2–4.8)
ALBUMIN/GLOB SERPL: 1.5 {RATIO} (ref 1–2)
ALP LIVER SERPL-CCNC: 88 U/L
ALT SERPL-CCNC: 51 U/L
ANION GAP SERPL CALC-SCNC: 6 MMOL/L (ref 0–18)
AST SERPL-CCNC: 34 U/L (ref ?–34)
BASOPHILS # BLD AUTO: 0.03 X10(3) UL (ref 0–0.2)
BASOPHILS NFR BLD AUTO: 0.3 %
BILIRUB SERPL-MCNC: 0.5 MG/DL (ref 0.2–1.1)
BUN BLD-MCNC: 17 MG/DL (ref 9–23)
BUN/CREAT SERPL: 11 (ref 10–20)
CALCIUM BLD-MCNC: 8.9 MG/DL (ref 8.7–10.4)
CHLORIDE SERPL-SCNC: 109 MMOL/L (ref 98–112)
CO2 SERPL-SCNC: 25 MMOL/L (ref 21–32)
CREAT BLD-MCNC: 1.54 MG/DL
DEPRECATED RDW RBC AUTO: 49 FL (ref 35.1–46.3)
EGFRCR SERPLBLD CKD-EPI 2021: 44 ML/MIN/1.73M2 (ref 60–?)
EOSINOPHIL # BLD AUTO: 0.06 X10(3) UL (ref 0–0.7)
EOSINOPHIL NFR BLD AUTO: 0.5 %
ERYTHROCYTE [DISTWIDTH] IN BLOOD BY AUTOMATED COUNT: 14.2 % (ref 11–15)
GLOBULIN PLAS-MCNC: 2.4 G/DL (ref 2–3.5)
GLUCOSE BLD-MCNC: 124 MG/DL (ref 70–99)
HCT VFR BLD AUTO: 35.4 %
HGB BLD-MCNC: 11.8 G/DL
IMM GRANULOCYTES # BLD AUTO: 0.08 X10(3) UL (ref 0–1)
IMM GRANULOCYTES NFR BLD: 0.7 %
LYMPHOCYTES # BLD AUTO: 0.62 X10(3) UL (ref 1–4)
LYMPHOCYTES NFR BLD AUTO: 5.5 %
MCH RBC QN AUTO: 31.6 PG (ref 26–34)
MCHC RBC AUTO-ENTMCNC: 33.3 G/DL (ref 31–37)
MCV RBC AUTO: 94.9 FL
MONOCYTES # BLD AUTO: 0.51 X10(3) UL (ref 0.1–1)
MONOCYTES NFR BLD AUTO: 4.5 %
NEUTROPHILS # BLD AUTO: 10.05 X10 (3) UL (ref 1.5–7.7)
NEUTROPHILS # BLD AUTO: 10.05 X10(3) UL (ref 1.5–7.7)
NEUTROPHILS NFR BLD AUTO: 88.5 %
OSMOLALITY SERPL CALC.SUM OF ELEC: 293 MOSM/KG (ref 275–295)
PLATELET # BLD AUTO: 333 10(3)UL (ref 150–450)
POTASSIUM SERPL-SCNC: 4.3 MMOL/L (ref 3.5–5.1)
PROT SERPL-MCNC: 6 G/DL (ref 5.7–8.2)
RBC # BLD AUTO: 3.73 X10(6)UL
SODIUM SERPL-SCNC: 140 MMOL/L (ref 136–145)
T3FREE SERPL-MCNC: 1.68 PG/ML (ref 2.4–4.2)
T4 FREE SERPL-MCNC: 0.8 NG/DL (ref 0.8–1.7)
TSI SER-ACNC: 0.43 MIU/ML (ref 0.55–4.78)
WBC # BLD AUTO: 11.4 X10(3) UL (ref 4–11)

## 2024-09-17 PROCEDURE — 85025 COMPLETE CBC W/AUTO DIFF WBC: CPT | Performed by: EMERGENCY MEDICINE

## 2024-09-17 PROCEDURE — 96374 THER/PROPH/DIAG INJ IV PUSH: CPT

## 2024-09-17 PROCEDURE — 84439 ASSAY OF FREE THYROXINE: CPT | Performed by: EMERGENCY MEDICINE

## 2024-09-17 PROCEDURE — 93005 ELECTROCARDIOGRAM TRACING: CPT

## 2024-09-17 PROCEDURE — 84481 FREE ASSAY (FT-3): CPT | Performed by: EMERGENCY MEDICINE

## 2024-09-17 PROCEDURE — 80053 COMPREHEN METABOLIC PANEL: CPT | Performed by: EMERGENCY MEDICINE

## 2024-09-17 PROCEDURE — 99285 EMERGENCY DEPT VISIT HI MDM: CPT

## 2024-09-17 PROCEDURE — 84443 ASSAY THYROID STIM HORMONE: CPT | Performed by: EMERGENCY MEDICINE

## 2024-09-17 PROCEDURE — 99284 EMERGENCY DEPT VISIT MOD MDM: CPT

## 2024-09-17 PROCEDURE — 93010 ELECTROCARDIOGRAM REPORT: CPT

## 2024-09-17 RX ORDER — METOPROLOL TARTRATE 1 MG/ML
5 INJECTION, SOLUTION INTRAVENOUS ONCE
Status: COMPLETED | OUTPATIENT
Start: 2024-09-17 | End: 2024-09-17

## 2024-09-17 RX ORDER — METOPROLOL SUCCINATE 25 MG/1
25 TABLET, EXTENDED RELEASE ORAL DAILY
Qty: 30 TABLET | Refills: 0 | Status: SHIPPED | OUTPATIENT
Start: 2024-09-17 | End: 2024-10-17

## 2024-09-17 NOTE — ED PROVIDER NOTES
Patient Seen in: [facility] Emergency Department    History     Chief Complaint   Patient presents with    Arrythmia/Palpitations    Dizziness       HPI    84-year-old male with past medical history significant for high blood pressure, hypothyroidism, GERD, anxiety, prostate cancer, high cholesterol, recently diagnosed pulmonary embolus on Eliquis presents to the ED for new onset atrial fibrillation.  Patient states that he had some routine vitals taken today at nursing facility and was noted to be hypertensive and tachycardic.  EKG was completed that showed new atrial fibrillation.  Patient states that he has had some intermittent episodes of shortness of breath with exertion in the last few days but today was able to do his physical therapy without any difficulty.  He denies any chest pain, cough.  He denies any lightheadedness.    History from Independent Source: Initial history given by EMS stating that patient had new onset A-fib today on routine vitals check.  They noted that patient was rate controlled with atrial fibrillation on their monitor.    External Records Reviewed: Reviewed nursing home records and patient had been taken to outside hospital last week with right lower quadrant abdominal pain and some dyspnea on exertion.  He had multiple test done and ended up having elevated D-dimer.  Patient was then found to have pulmonary embolus and was started on Eliquis.  He had a relatively normal echo without any RV strain.  Patient was discharged back to nursing facility.    History reviewed.   Past Medical History:    Esophageal reflux    Essential hypertension    Hyperlipidemia    Hypothyroidism    Pituitary lesion (HCC)    Prostate CA (HCC)    In remission, received radiation and seeding 10 years ago       History reviewed.   Past Surgical History:   Procedure Laterality Date    Excis pituitary,transnasal/septal      Other surgical history      radiation optic nerve         Medications :  (Not in a  hospital admission)       No family history on file.    Smoking Status:   Social History     Socioeconomic History    Marital status:    Tobacco Use    Smoking status: Never    Smokeless tobacco: Never   Vaping Use    Vaping status: Never Used   Substance and Sexual Activity    Alcohol use: Yes     Comment: weekly       Constitutional and vital signs reviewed.      Social History and Family History elements reviewed from today, pertinent positives to the presenting problem noted.    Physical Exam     ED Triage Vitals   BP 09/17/24 1551 101/68   Pulse 09/17/24 1551 94   Resp 09/17/24 1551 18   Temp 09/17/24 1555 98.1 °F (36.7 °C)   Temp src --    SpO2 09/17/24 1551 96 %   O2 Device 09/17/24 1602 None (Room air)       Physical Exam   Constitutional: AAOx3, well nourished, NAD  HEENT: Normocephalic, PERRLA, MMM  CV: Irregularly irregular, no m/r/g, normal distal pulses  Pulmonary/Chest: CTA b/l with no rales, wheezes.  No chest wall tenderness  Abdominal: Nontender.  Nondistended. Soft. Bowel sounds are normal.   Neck/Back:   :   Musculoskeletal: Normal range of motion. No deformity.   Neurological: Awake, alert. Normal reflexes. No cranial nerve deficit.    Skin: Skin is warm and dry. No rash noted. No erythema.   Psychiatric:      All measures to prevent infection transmission during my interaction with the patient were taken. The patient was already wearing a droplet mask on my arrival to the room. Personal protective equipment was worn throughout the duration of the exam.      ED Course        Labs Reviewed   CBC WITH DIFFERENTIAL WITH PLATELET - Abnormal; Notable for the following components:       Result Value    WBC 11.4 (*)     RBC 3.73 (*)     HGB 11.8 (*)     HCT 35.4 (*)     RDW-SD 49.0 (*)     Neutrophil Absolute Prelim 10.05 (*)     Neutrophil Absolute 10.05 (*)     Lymphocyte Absolute 0.62 (*)     All other components within normal limits   COMP METABOLIC PANEL (14) - Abnormal; Notable for the  following components:    Glucose 124 (*)     Creatinine 1.54 (*)     eGFR-Cr 44 (*)     ALT 51 (*)     AST 34 (*)     All other components within normal limits   TSH W REFLEX TO FREE T4 - Abnormal; Notable for the following components:    TSH 0.427 (*)     All other components within normal limits   T4, FREE (S)   RAINBOW DRAW LAVENDER   RAINBOW DRAW LIGHT GREEN   RAINBOW DRAW BLUE     My Independent Interpretation of EKG (if performed): EKG    Rate, intervals and axes as noted on EKG Report.  Rate: 88 bpm  Rhythm: Atrial Fibrillation  Reading: Atrial fibrillation with occasional PVC, prolonged QT interval, no acute ST changes, normal axis             Monitor Interpretation:   atrial fibrillation, with ventricular rate of 80 bpm, occasional PVC  as interpreted by me.      Imaging Results Available and Reviewed while in ED: No results found.  ED Medications Administered:   Medications   metoprolol (Lopressor) 5 mg/5mL injection 5 mg (has no administration in time range)             MDM     Vitals:    09/17/24 1551 09/17/24 1555 09/17/24 1615   BP: 101/68  123/75   Pulse: 94  74   Resp: 18  12   Temp:  98.1 °F (36.7 °C)    SpO2: 96%  93%   Weight: 106.6 kg       *I personally reviewed and interpreted all ED vitals.    Independent Interpretation of Studies:     Social Determinants of Health:     Procedures:      Differential/MDM/Shared Decision Making: Differential Diagnosis includes atrial fibrillation, electrolyte abnormality, hyperthyroid, dehydration, electrolyte abnormality, others.      The patient already has recently diagnosed PE currently on Eliquis, high blood pressure, GERD, hypothyroidism, anxiety, prostate cancer, high cholesterol, to contribute to the complexity of this ED evaluation.           Patient has relatively unremarkable lab work in the ED.  He has remained rate controlled with heart rates between 75 and 95 bpm.  He remains normotensive here..  Discussed case with patient's primary care physician  and will send back to nursing home with the addition of low-dose metoprolol as patient is otherwise asymptomatic at this time.  Primary care physician is in agreement.      Condition upon leaving the department: Stable    Disposition and Plan     Clinical Impression:  1. Atrial fibrillation, new onset (HCC)        Disposition:  Discharge    Follow-up:  Kat Contreras MD  [address]  [place] [address] [phone]    Call in 2 day(s)        Medications Prescribed:  Current Discharge Medication List        START taking these medications    Details   metoprolol succinate ER 25 MG Oral Tablet 24 Hr Take 1 tablet (25 mg total) by mouth daily.  Qty: 30 tablet, Refills: 0

## 2024-09-17 NOTE — ED INITIAL ASSESSMENT (HPI)
Pt via Ems for new onset afib. Per EMS, was getting routine vitals taken and was found to be hypertensive and tachycardic. EKG showed afib,. No hx afib.       Pt c/o dizziness x few weeks. Denies chest pain, denies NVD, denies SOB.

## 2024-09-17 NOTE — PROGRESS NOTES
Pulmonary Consult Note  SNF External Facility - [facility]    History of Present Illness:   Jeremy Adkins is a(n) 84 year old male with PMH notable for HTN, HLD, hypothyroid, JOHANNY, pulmonary nodule, who I am now evaluating for PE and pneumonia at [facility]. Patient is from [place] and was hospitalized there a week ago. Initially presented to ER with right sided abdominal pain, found to have RUL and RLL pulmonary emboli and bibasilar infiltrates. Lower extremity Doppler US negative for DVT but found to have subacute thrombus in R small saphenous vein. Echocardiogram revealed mild LVH with EF 55-60% with no evidence of RV strain. This was his first VTE event. No recent travel or surgery but he does admit to sedentary lifestyle prior to hospitalization. He is on Eliquis. He has history of mild JOHANNY and did not tolerate CPAP in the past. He has history of pulmonary nodule. According to recent hospital notes, CT chest demonstrated partially calcified RUL mass measuring 4 cm. There is notation of partially calcified RUL nodule on [facility] [place], size is not mentioned. Patient smoked 0.5 ppd for 3 years and quit 50 years ago. Patient admits to chronic dyspnea on exertion for several years with progressive worsening. No shortness of breath at rest. He has anxiety and this contributes to worsening dyspnea. No cough or wheezing. No dysphagia. No fever or chills. He had episode of dizziness today while standing up, improved with laying down. Appetite is decreased.    Past Medical History:  JOHANNY, HTN, HLD, hypothyroidism, pituitary lesion, prostate cancer, GERD, pulmonary nodule, PE, CKD    Past Surgical History:  Past Surgical History:   Procedure Laterality Date    Excis pituitary,transnasal/septal      Other surgical history      radiation optic nerve     Social History:  -Tobacco: Former, quit 50 years ago after having smoked 0.5 ppd for 3 years  -Alcohol: 1 shot of liquor per  day    Allergies: None    Medications: Reviewed on [facility] EMR; pertinent pulmonary meds include: Eliquis, Augmentin    Review of Systems: Vision notable for visual impairment. Ears nose and throat notable for hearing impairment. Bowel normal. Bladder function normal. Thyroid disease. Anxiety and depression. No rash. Muscles and joints unremarkable. Weight gain of uncertain amount.    Physical Exam:  /73, HR 74, RR 18, T 96.6, sat 95%   Constitutional: Obese, awake, alert, NAD  HEENT: Head NC/AT, PEERL, MMM  Cardio: RRR, S1S2, no murmurs  Respiratory: Thorax symmetrical with no labored breathing. Decreased breath sounds at R base with few crackles. Left lung clear.  GI: NABS. Abd soft, non-tender.  Extremities: No clubbing or cyanosis. No RLE edema. Trace LLE edema. No calf tenderness.  Neurologic: A&Ox3. No gross motor deficits.  Skin: Warm, dry.  Psych: Calm, cooperative. Pleasant affect.    Results:  -Labs 9/6/24: Wbc 9.13, Hgb 10.5, Plt 289, Cr 1.45, BUN 15, Na 142, K 4.1, CO2 25  -CTA chest (outside hospital, per medical records): filling defect in R upper and R lower lobe arteries compatible with pulmonary embolism. Bibasilar infiltrates. Partially calcified mass in RUL measuring 4 cm.  -Echo (outside hospital, per medical records): Mild LVH, EF 55-60%, no evidence of RV strain.  -LE Doppler US (outside hospital, per medical records): No DVT. Subacute thrombosis involving R SSV.    Assessment and Plan:  1. Acute PE - on Eliquis. Only risk factor appears to be sedentary lifestyle. Will need minimum 3 months of full anticoagulation, possibly indefinite given unprovoked PE.    Plan:  -Continue Eliquis    2. Pneumonia - on Augmentin. Clinically improving. Decreased BS in R base may be related to PE.    Plan:  -Complete course of Augmentin  -Follow up chest x-ray    3. Lung mass - 4 cm RUL partially calcified lung mass noted in recent OSH notes. Prior CT chest from 11/2018 ([facility]) with mention of RUL  partially calcified nodule, no size.    Plan:  -Will need follow up CT chest - will further discuss timing    4. History of mild JOHANNY - did not tolerate CPAP.    Plan:  -Weight loss    Casey Lind PA-C  Pulmonary Medicine

## 2024-09-17 NOTE — ED QUICK NOTES
Pt verbalizes understanding of discharge paperwork including medications, follow-up care, and education. Pt a/o x4, VSS, NAD, provided with wheelchair. Escorted by daughter who will take him to [facility].

## 2024-09-17 NOTE — ED QUICK NOTES
Report given to Kalyan, nurse, at [facility]. Aware pt will be coming back and that daughter is bringing him.

## 2024-09-18 LAB
Q-T INTERVAL: 402 MS
QRS DURATION: 78 MS
QTC CALCULATION (BEZET): 486 MS
R AXIS: 31 DEGREES
T AXIS: 53 DEGREES
VENTRICULAR RATE: 88 BPM

## 2024-09-19 ENCOUNTER — SNF VISIT (OUTPATIENT)
Dept: INTERNAL MEDICINE CLINIC | Facility: SKILLED NURSING FACILITY | Age: 84
End: 2024-09-19

## 2024-09-19 VITALS
TEMPERATURE: 97 F | OXYGEN SATURATION: 98 % | SYSTOLIC BLOOD PRESSURE: 142 MMHG | HEART RATE: 65 BPM | RESPIRATION RATE: 18 BRPM | DIASTOLIC BLOOD PRESSURE: 74 MMHG

## 2024-09-19 DIAGNOSIS — M17.11 PRIMARY OSTEOARTHRITIS OF RIGHT KNEE: ICD-10-CM

## 2024-09-19 DIAGNOSIS — Z87.898 H/O: PITUITARY TUMOR: ICD-10-CM

## 2024-09-19 DIAGNOSIS — E78.5 HYPERLIPIDEMIA, UNSPECIFIED HYPERLIPIDEMIA TYPE: ICD-10-CM

## 2024-09-19 DIAGNOSIS — I48.91 NEW ONSET A-FIB (HCC): Primary | ICD-10-CM

## 2024-09-19 DIAGNOSIS — F32.A DEPRESSION, UNSPECIFIED DEPRESSION TYPE: ICD-10-CM

## 2024-09-19 DIAGNOSIS — R42 DIZZINESS: ICD-10-CM

## 2024-09-19 DIAGNOSIS — I26.99 ACUTE PULMONARY EMBOLISM, UNSPECIFIED PULMONARY EMBOLISM TYPE, UNSPECIFIED WHETHER ACUTE COR PULMONALE PRESENT (HCC): ICD-10-CM

## 2024-09-19 DIAGNOSIS — I10 HYPERTENSION, UNSPECIFIED TYPE: ICD-10-CM

## 2024-09-19 PROCEDURE — 99310 SBSQ NF CARE HIGH MDM 45: CPT

## 2024-09-19 NOTE — PROGRESS NOTES
Jeremy FIORE Lucille  : 1940  Age 84 year old  male patient is admitted to [facility] for rehabilitation and strengthening.       [facility] Admission in [place] 24 - 24     Reason for visit: Follow up on PE and PNA      HPI  Pt is an 84-year-old male with a pmh of hypertension, hypothyroidism, GERD and anxiety who presented to [facility] ER in [place] for right sided abdominal pain. He reported right sided lower quadrant pain with inspiration and described it as sharp with deep inspiration. Pt recently underwent stress test, Holter monitor and echocardiogram that he was told were normal. [facility] CT of abdomen was obtained which showed renal cyst and cholelithiasis with minimal gallbladder distension. He was found to have mild dehydration with mild elevated white blood count and diminished renal function. Pt was then transferred to [facility] due to elevated d-dimer and further evaluation.  At [place] CT chest revealed large PE and pneumonia. Abdominal imaging was also obtained d/t c/o vomiting and abdominal pan which revealed gallstones without obstruction or cholecystitis. Pt was treated with Lovenox for anticoagulation and IV abx. Eventually converted Lovenox to Eliquis and discharged pt to [facility] for rehab and strengthening.     Pt seen and examined in follow up. Pt is sitting up in chair, appears comfortable in NAD. Pt reports he still occasionally get sob with activity, tries to stay on top of using his Incentive Spirometry. He was recently sent to [place] for Afib RVR on 24. Pt was started on metoprolol and was already receiving anticoagulation tx for pe. Pt was stabilized and discharged back to the facility the same day. Pt states PT is going well. Appetite is improving, denies n/v or abdominal pain. Denies constipation or diarrhea. He has no other issues/concerns. Vss     ALLERGIES:  No Known Allergies    IMMUNIZATIONS  Immunization History    Administered Date(s) Administered    Covid-19 Vaccine Moderna 100 mcg/0.5 ml 12/30/2020, 01/27/2021        CODE STATUS:  Full Code    ADVANCED CARE PLANNING TEAM: Will need family care plan    CURRENT MEDICATIONS - reviewed and updated on SNF EMR     SUBJECTIVE    Pt seen and examined in follow up. Pt is sitting up in chair, appears comfortable in NAD. Pt reports he still occasionally get sob with activity, tries to stay on top of using his Incentive Spirometry. He was recently sent to [place] for Afib RVR on 09/17/24. Pt was started on metoprolol and was already receiving anticoagulation tx for pe. Pt was stabilized and discharged back to the facility the same day. Pt states PT is going well. Appetite is improving, denies n/v or abdominal pain. Denies constipation or diarrhea. He has no other issues/concerns. Vss     PHYSICAL EXAM:  Vitals:    09/19/24 1159   BP: 142/74   Pulse: 65   Resp: 18   Temp: 97.2 °F (36.2 °C)      GENERAL HEALTH:well developed, well nourished, in no apparent distress   LINES, TUBES, DRAINS:  none  SKIN: no rashes, no suspicious lesions  WOUND: see wound assessment,   EYES: PERRLA, EOMI, sclera anicteric, conjunctiva normal; there is no nystagmus, no drainage from eyes  HENT: normocephalic; normal nose, no nasal drainage, mucous membranes pink, moist, pharynx no exudate, no visible cerumen.   NECK:supple, FROM; no JVD, no TMG, no carotid bruits   BREAST: deferred exam   RESPIRATORY:diminished at the bases   CARDIOVASCULAR: S1, S2 normal, RRR; no S3, no S4  ABDOMEN:  normal active BS+, soft, nondistended; no organomegaly, no masses; no bruits; nontender, no guarding, no rebound tenderness.  :no suprapubic distension  LYMPHATIC:no lymphedema  MUSCULOSKELETAL: no acute synovitis upper or lower extremity   EXTREMITIES/VASCULAR:edema right LE and edema left LE  NEUROLOGIC: pt reports dizziness when sitting up   PSYCHIATRIC: alert and oriented x 3; affect appropriate     DIAGNOSTICS REVIEWED  AT THIS VISIT:  Labs 09/16/24: wbc 9.13, hgb 10.5, plt 289, gluc 81, na 142, k 4.1, bun 15, creat 1.45     SEE PLAN BELOW    New onset Afib  - Seen at [place] ED for Afib on 09/17/24  - started on metoprolol succinate 50 mg daily   - already on anticoagulation with Eliquis for PE   - cardiology consult in rehab   - monitor     Dizziness - improving   - check orthostatic BPs x 2 days   - recently diagnosed with Afib which could have contributed   - PT/OT   - monitor      PE  - Hospital work up in [place] revealed elevated D dimer and CT chest Large PT  - Initially treated with Lovenox, then converted to eliquis   - continue eliquis, currently 10 mg bid x 6 days, then 5 mg bid starting 09/18/24  - monitor      PNA   - Treated with IV antibiotics in hospital, then transitioned to Augmentin 875 - 125 BID x 7 days, end date 09/19/24  - Also developed a pleuritic pain which was initially treated with IV dilaudid then prn norco  - continue norco 5 q6h prn in rehab   - currently on RA in rehab   - pulm following in rehab   - weekly labs in rehab   - monitor      Cholelithiasis   - abdominal imaging in hospital with no obstruction or cholecystitis   - monitor      Loose stools   - pt reports improvement   - check Cdiff   - on Florastor 250 mg x 14 days while on abx   - monitor      Hypertension  - continue lisinopril 20 mg daily      HLD   - continue simvastatin 40 mg hs      Hypothyroidism  - continue levothyroxine 100 mcg daily      Chronic Kidney Disease   - stable   - weekly labs in rehab      Pituitary Tumor   - continue hydrocortisone 20 mg bid   - monitor       Depression   Anxiety   - continue zoloft 100 mg daily      Physical Deconditioning/Impaired mobility and ADLs/At risk for falling  - Fall Precautions  - PT/OT to evaluate and treat  - [facility] team to establish care plan meeting with patient and POA/family as appropriate  - Anticipate DC on or before TBD; SW to assist patient/family w/ DC planning  - DC Plan:   TBD     Vitamins/supplements as r/t deficiencies  - [facility] RD to follow while in rehab; supplementation/diet as per [facility] RD  - May continue home supplements, cyanocobalamin 1000 mcg daily, multivitamin daily      LABS  - CBC/CMP weekly while in [facility]    FOLLOW UP APPOINTMENTS  No future appointments.     35 minutes spent w/ patient and staff, including but not limited to/ reviewing present status, needs, abilities with disciplines, reviewing medical records, vital signs, labs, completing medication reconciliation and entering orders for continued care in [facility]     Note to patient: The 21st Century Cures Act makes medical notes like these available to patients in the interest of transparency. However, this is a medical document intended as peer to peer communication. It is written in medical language and may contain abbreviations or verbiage that are unfamiliar. It may appear blunt or direct. Medical documents are intended to carry relevant information, facts as evident, and the clinical opinion of the practitioner who signs the document.     Lindsey Koch, APRN   09/19/24

## 2024-09-23 ENCOUNTER — EXTERNAL FACILITY (OUTPATIENT)
Dept: PULMONOLOGY | Facility: CLINIC | Age: 84
End: 2024-09-23

## 2024-09-23 ENCOUNTER — SNF VISIT (OUTPATIENT)
Dept: INTERNAL MEDICINE CLINIC | Facility: SKILLED NURSING FACILITY | Age: 84
End: 2024-09-23

## 2024-09-23 VITALS
SYSTOLIC BLOOD PRESSURE: 159 MMHG | HEART RATE: 60 BPM | DIASTOLIC BLOOD PRESSURE: 74 MMHG | RESPIRATION RATE: 18 BRPM | OXYGEN SATURATION: 96 % | TEMPERATURE: 97 F

## 2024-09-23 DIAGNOSIS — N18.9 CHRONIC KIDNEY DISEASE, UNSPECIFIED CKD STAGE: ICD-10-CM

## 2024-09-23 DIAGNOSIS — F32.A DEPRESSION, UNSPECIFIED DEPRESSION TYPE: ICD-10-CM

## 2024-09-23 DIAGNOSIS — F41.9 ANXIETY: ICD-10-CM

## 2024-09-23 DIAGNOSIS — D72.829 LEUKOCYTOSIS, UNSPECIFIED TYPE: ICD-10-CM

## 2024-09-23 DIAGNOSIS — R19.7 DIARRHEA, UNSPECIFIED TYPE: ICD-10-CM

## 2024-09-23 DIAGNOSIS — M17.11 PRIMARY OSTEOARTHRITIS OF RIGHT KNEE: ICD-10-CM

## 2024-09-23 DIAGNOSIS — J18.9 PNEUMONIA OF BOTH LOWER LOBES DUE TO INFECTIOUS ORGANISM: ICD-10-CM

## 2024-09-23 DIAGNOSIS — I10 HYPERTENSION, UNSPECIFIED TYPE: ICD-10-CM

## 2024-09-23 DIAGNOSIS — I26.99 ACUTE PULMONARY EMBOLISM WITHOUT ACUTE COR PULMONALE, UNSPECIFIED PULMONARY EMBOLISM TYPE (HCC): Primary | ICD-10-CM

## 2024-09-23 DIAGNOSIS — R91.8 LUNG MASS: ICD-10-CM

## 2024-09-23 DIAGNOSIS — I26.99 ACUTE PULMONARY EMBOLISM, UNSPECIFIED PULMONARY EMBOLISM TYPE, UNSPECIFIED WHETHER ACUTE COR PULMONALE PRESENT (HCC): Primary | ICD-10-CM

## 2024-09-23 DIAGNOSIS — G47.33 OSA (OBSTRUCTIVE SLEEP APNEA): ICD-10-CM

## 2024-09-23 DIAGNOSIS — K80.20 CALCULUS OF GALLBLADDER WITHOUT CHOLECYSTITIS WITHOUT OBSTRUCTION: ICD-10-CM

## 2024-09-23 PROCEDURE — 99309 SBSQ NF CARE MODERATE MDM 30: CPT | Performed by: PHYSICIAN ASSISTANT

## 2024-09-23 NOTE — PROGRESS NOTES
Jeremy Adkins  : 1940  Age 84 year old  male patient is admitted to [facility] for rehabilitation and strengthening.       [facility] Admission in [place] 24 - 24     Reason for visit: Follow up on PE and PNA      HPI  Pt is an 84-year-old male with a pmh of hypertension, hypothyroidism, GERD and anxiety who presented to [facility] ER in [place] for right sided abdominal pain. He reported right sided lower quadrant pain with inspiration and described it as sharp with deep inspiration. Pt recently underwent stress test, Holter monitor and echocardiogram that he was told were normal. [facility] CT of abdomen was obtained which showed renal cyst and cholelithiasis with minimal gallbladder distension. He was found to have mild dehydration with mild elevated white blood count and diminished renal function. Pt was then transferred to [facility] due to elevated d-dimer and further evaluation.  At [place] CT chest revealed large PE and pneumonia. Abdominal imaging was also obtained d/t c/o vomiting and abdominal pan which revealed gallstones without obstruction or cholecystitis. Pt was treated with Lovenox for anticoagulation and IV abx. Eventually converted Lovenox to Eliquis and discharged pt to [facility] for rehab and strengthening    Pt seen and examined in follow up. Pt is sitting up in wheelchair, appears comfortable in NAD. Pt states PT is going well and reports intermittent shortness of breath with activity. Pt denies chest pain. Denies change in appetite, N/V or abdominal pain. He reports his diarrhea has worsened over the past 3-4 days and would like to get it under control. He has not been taking any stool softeners since his stay at the facility and the last cdiff test from 24 was negative. Pt also with hx of cholelithiasic. He has no other issues/concerns. Vss      ALLERGIES:  No Known Allergies    IMMUNIZATIONS  Immunization History   Administered Date(s)  Administered    Covid-19 Vaccine Moderna 100 mcg/0.5 ml 12/30/2020, 01/27/2021        CODE STATUS:  Full Code    ADVANCED CARE PLANNING TEAM: Will need family care plan    CURRENT MEDICATIONS - reviewed and updated on SNF EMR     SUBJECTIVE    Pt seen and examined in follow up. Pt is sitting up in wheelchair, appears comfortable in NAD. Pt states PT is going well and reports intermittent shortness of breath with activity. Pt denies chest pain. Denies change in appetite, N/V or abdominal pain. He reports his diarrhea has worsened over the past 3-4 days and would like to get it under control. He has not been taking any stool softeners since his stay at the facility and the last cdiff test from 09/14/24 was negative. Pt also with hx of cholelithiasic. He has no other issues/concerns. s      PHYSICAL EXAM:  Vitals:    09/23/24 1321   BP: 159/74   Pulse: 60   Resp: 18   Temp: 97.3 °F (36.3 °C)   SpO2: 96%      GENERAL HEALTH:well developed, well nourished, in no apparent distress   LINES, TUBES, DRAINS:  none  SKIN: no rashes, no suspicious lesions  WOUND: see wound assessment,   EYES: PERRLA, EOMI, sclera anicteric, conjunctiva normal; there is no nystagmus, no drainage from eyes  HENT: normocephalic; normal nose, no nasal drainage, mucous membranes pink, moist, pharynx no exudate, no visible cerumen.   NECK:supple, FROM; no JVD, no TMG, no carotid bruits   BREAST: deferred exam   RESPIRATORY:diminished at the bases   CARDIOVASCULAR: S1, S2 normal, RRR; no S3, no S4  ABDOMEN:  normal active BS+, soft, nondistended; no organomegaly, no masses; no bruits; nontender, no guarding, no rebound tenderness.  :no suprapubic distension  LYMPHATIC:no lymphedema  MUSCULOSKELETAL: no acute synovitis upper or lower extremity   EXTREMITIES/VASCULAR:edema right LE and edema left LE  NEUROLOGIC: pt reports dizziness when sitting up   PSYCHIATRIC: alert and oriented x 3; affect appropriate     DIAGNOSTICS REVIEWED AT THIS  VISIT:  Labs 09/16/24: wbc 9.13, hgb 10.5, plt 289, gluc 81, na 142, k 4.1, bun 15, creat 1.45   Labs 09/23/24: wbc 12.56, hgb 10.8, plt 378, gluc 72, na 144, k 4.5, bun 16, creat 1.42    SEE PLAN BELOW    Leukocytosis   - check UA with C/S  - F/u Chest Xray considering recent pneumonia   - also checking GI panel with Cdiff d/t continuous diarrhea   - recheck cbc and bmp on 09/25/24  - monitor     Loose stools   - pt reports continued diarrhea since his stay at the facility   - last cdiff result was negative 9/14/2024  - on Florastor 250 mg x 14 days while on abx   - recheck Cdiff   - symptoms could be a result of cholelithiasis   - consider imodium if GI panel  and cdiff negative   - monitor     New onset Afib  - Seen at [place] ED for Afib on 09/17/24  - started on metoprolol succinate 50 mg daily   - already on anticoagulation with Eliquis for PE   - cardiology consult in rehab   - monitor      Dizziness - improving   - check orthostatic BPs x 2 days   - recently diagnosed with Afib which could have contributed   - PT/OT   - monitor      PE  - Hospital work up in [place] revealed elevated D dimer and CT chest Large PT  - Initially treated with Lovenox, then converted to eliquis   - continue eliquis, currently 10 mg bid x 6 days, then 5 mg bid starting 09/18/24  - monitor      PNA   - Treated with IV antibiotics in hospital, then transitioned to Augmentin 875 - 125 BID x 7 days, end date 09/19/24  - Also developed a pleuritic pain which was initially treated with IV dilaudid then prn norco  - continue norco 5 q6h prn in rehab   - currently on RA in rehab   - pulm following in rehab   - weekly labs in rehab   - monitor      Cholelithiasis   - abdominal imaging in hospital with no obstruction or cholecystitis   - monitor      Hypertension  - continue lisinopril 20 mg daily      HLD   - continue simvastatin 40 mg hs      Hypothyroidism  - continue levothyroxine 100 mcg daily      Chronic Kidney Disease   - stable    - weekly labs in rehab      Pituitary Tumor   - continue hydrocortisone 20 mg bid   - monitor       Depression   Anxiety   - continue zoloft 100 mg daily   - started on xanax q12h prn for anxiety      Physical Deconditioning/Impaired mobility and ADLs/At risk for falling  - Fall Precautions  - PT/OT to evaluate and treat  - [facility] team to establish care plan meeting with patient and POA/family as appropriate  - Anticipate DC on or before TBD; SW to assist patient/family w/ DC planning  - DC Plan:  TBD     Vitamins/supplements as r/t deficiencies  - [facility] RD to follow while in rehab; supplementation/diet as per [facility] RD  - May continue home supplements, cyanocobalamin 1000 mcg daily, multivitamin daily      LABS  - CBC/CMP weekly while in [facility]    FOLLOW UP APPOINTMENTS  No future appointments.     35 minutes spent w/ patient and staff, including but not limited to/ reviewing present status, needs, abilities with disciplines, reviewing medical records, vital signs, labs, completing medication reconciliation and entering orders for continued care in [facility]     Note to patient: The 21st Century Cures Act makes medical notes like these available to patients in the interest of transparency. However, this is a medical document intended as peer to peer communication. It is written in medical language and may contain abbreviations or verbiage that are unfamiliar. It may appear blunt or direct. Medical documents are intended to carry relevant information, facts as evident, and the clinical opinion of the practitioner who signs the document.     Lindsey Koch, APRN   09/23/24

## 2024-09-24 NOTE — PROGRESS NOTES
Pulmonary Progress Note  SNF External Facility - [facility]     History of Present Illness:   Jeremy Adkins is a(n) 84 year old male with PMH notable for HTN, HLD, hypothyroid, JOHANNY, pulmonary nodule, who I am seeing for follow up at [facility]. Patient is from [place] and was hospitalized there 2 weeks ago. Initially presented to ER with right sided abdominal pain, found to have RUL and RLL pulmonary emboli and bibasilar infiltrates. Lower extremity Doppler US negative for DVT but found to have subacute thrombus in R small saphenous vein. Echocardiogram revealed mild LVH with EF 55-60% with no evidence of RV strain. This was his first VTE event. No recent travel or surgery but he does admit to sedentary lifestyle prior to hospitalization. He is on Eliquis. He has history of mild JOHANNY and did not tolerate CPAP in the past. He has history of pulmonary nodule. According to recent hospital notes, CT chest demonstrated partially calcified RUL mass measuring 4 cm. There is notation of partially calcified RUL nodule on CTA chest [facility], size is not mentioned. Patient smoked 0.5 ppd for 3 years and quit 50 years ago.     Interval history: Patient with new onset Afib, started on metoprolol. He has chronic dyspnea on exertion, ongoing for several years and per his baseline. No shortness of breath at rest. No cough or wheezing. No chest pain or pleurisy.      Review of Systems: Vision notable for visual impairment. Ears nose and throat notable for hearing impairment. Bowel normal. Bladder function normal. Thyroid disease. Anxiety and depression. No rash. Muscles and joints unremarkable.     Physical Exam:  /74, HR 68, RR 18, T 97.0, sat 99%   Constitutional: Obese, awake, alert, NAD  HEENT: Head NC/AT, PEERL, MMM  Cardio: Regular rate, irregular rhythm, S1S2, no murmurs  Respiratory: Thorax symmetrical with no labored breathing. Decreased breath sounds at R base. Left lung  clear.  GI: NABS. Abd soft, non-tender.  Extremities: No clubbing or cyanosis. No LE edema. No calf tenderness.  Neurologic: A&Ox3. No gross motor deficits.  Skin: Warm, dry.  Psych: Calm, cooperative. Pleasant affect.     Results:  -Labs 9/23/24: Wbc 12.56, Ggb 10.8, Plt 378, Cr 1.42, BUN 16, Na 144, K 4.5, CO2 27  -Labs 9/6/24: Wbc 9.13, Hgb 10.5, Plt 289, Cr 1.45, BUN 15, Na 142, K 4.1, CO2 25  -CTA chest (outside hospital, per medical records): filling defect in R upper and R lower lobe arteries compatible with pulmonary embolism. Bibasilar infiltrates. Partially calcified mass in RUL measuring 4 cm.  -Echo (outside hospital, per medical records): Mild LVH, EF 55-60%, no evidence of RV strain.  -LE Doppler US (outside hospital, per medical records): No DVT. Subacute thrombosis involving R SSV.     Assessment and Plan:  1. Acute PE - on Eliquis. Only risk factor appears to be sedentary lifestyle. Will need minimum 3 months of full anticoagulation, consider indefinite given unprovoked PE.     Plan:  -Continue Eliquis     2. Pneumonia - completed Augmentin. Clinically improved. Decreased BS in R base may be related to PE.     Plan:  -Follow up chest x-ray     3. Lung mass - 4 cm RUL partially calcified lung mass noted in recent OSH notes. Prior CT chest from 11/2018 ([facility]) with mention of RUL partially calcified nodule, no size mentioned.     Plan:  -Follow up chest x-ray as above  -Anticipate need for CT chest in the short term - d/w patient and will await chest x-ray     4. History of mild JOHANNY - did not tolerate CPAP.     Plan:  -Weight loss    ADDENDUM 9/25/2024: Chest x-ray reviewed. No prior imaging for comparison. Hospital reports with RUL mass measuring 4 cm.    Chest x-ray 9/24/24 at [facility]: Prominent increased density right base without effusion.  Left lung without focal opacity or effusion. No pneumothorax. Chronic appearing linear interstitial prominence and hyperlucency likely chronic in  nature.  The cardiac silhouette is not enlarged, with aortic calcifications.  Thoracic spondylosis.  Calcified granuloma or hamartoma right mid chest.    Plan:  -CT chest    Casey Lind PA-C  Pulmonary Medicine

## 2024-09-26 ENCOUNTER — SNF VISIT (OUTPATIENT)
Dept: INTERNAL MEDICINE CLINIC | Facility: SKILLED NURSING FACILITY | Age: 84
End: 2024-09-26

## 2024-09-26 VITALS
SYSTOLIC BLOOD PRESSURE: 136 MMHG | DIASTOLIC BLOOD PRESSURE: 74 MMHG | TEMPERATURE: 98 F | HEART RATE: 61 BPM | RESPIRATION RATE: 18 BRPM | OXYGEN SATURATION: 96 %

## 2024-09-26 DIAGNOSIS — M17.11 PRIMARY OSTEOARTHRITIS OF RIGHT KNEE: ICD-10-CM

## 2024-09-26 DIAGNOSIS — J69.0 ASPIRATION PNEUMONIA, UNSPECIFIED ASPIRATION PNEUMONIA TYPE, UNSPECIFIED LATERALITY, UNSPECIFIED PART OF LUNG (HCC): ICD-10-CM

## 2024-09-26 DIAGNOSIS — A04.72 C. DIFFICILE COLITIS: Primary | ICD-10-CM

## 2024-09-26 DIAGNOSIS — I10 HYPERTENSION, UNSPECIFIED TYPE: ICD-10-CM

## 2024-09-26 DIAGNOSIS — I26.99 ACUTE PULMONARY EMBOLISM, UNSPECIFIED PULMONARY EMBOLISM TYPE, UNSPECIFIED WHETHER ACUTE COR PULMONALE PRESENT (HCC): ICD-10-CM

## 2024-09-26 DIAGNOSIS — N18.9 CHRONIC KIDNEY DISEASE, UNSPECIFIED CKD STAGE: ICD-10-CM

## 2024-09-26 DIAGNOSIS — Z87.898 H/O: PITUITARY TUMOR: ICD-10-CM

## 2024-09-26 DIAGNOSIS — I48.91 ATRIAL FIBRILLATION, UNSPECIFIED TYPE (HCC): ICD-10-CM

## 2024-09-26 PROCEDURE — 99309 SBSQ NF CARE MODERATE MDM 30: CPT

## 2024-09-26 NOTE — PROGRESS NOTES
Jeremy Adkins  : 1940  Age 84 year old  male patient is admitted to [facility] for rehabilitation and strengthening.       [facility] Admission in [place] 24 - 24     Reason for visit: Follow up on PE and PNA      HPI  Pt is an 84-year-old male with a pmh of hypertension, hypothyroidism, GERD and anxiety who presented to [facility] ER in [place] for right sided abdominal pain. He reported right sided lower quadrant pain with inspiration and described it as sharp with deep inspiration. Pt recently underwent stress test, Holter monitor and echocardiogram that he was told were normal. [facility] CT of abdomen was obtained which showed renal cyst and cholelithiasis with minimal gallbladder distension. He was found to have mild dehydration with mild elevated white blood count and diminished renal function. Pt was then transferred to [facility] due to elevated d-dimer and further evaluation.  At [place] CT chest revealed large PE and pneumonia. Abdominal imaging was also obtained d/t c/o vomiting and abdominal pan which revealed gallstones without obstruction or cholecystitis. Pt was treated with Lovenox for anticoagulation and IV abx. Eventually converted Lovenox to Eliquis and discharged pt to [facility] for rehab and strengthening    Pt seen and examined in follow up. Pt is sitting on edge of bed, appears comfortable and in NAD. Pt states he feels good and reports breathing is better. He reports he is scheduled to get a repeat CT of his lungs for PE follow up. Pt denies chest pain. Denies change in appetite, N/V or abdominal pain. Pt continues to have loose stools, Cdiff positive on 24. Has no other issues/concerns. Vss     ALLERGIES:  No Known Allergies    IMMUNIZATIONS  Immunization History   Administered Date(s) Administered    Covid-19 Vaccine Moderna 100 mcg/0.5 ml 2020, 2021        CODE STATUS:  Full Code    ADVANCED CARE PLANNING TEAM: Will need  family care plan    CURRENT MEDICATIONS - reviewed and updated on [facility] EMR     SUBJECTIVE    REVIEW OF SYSTEMS:  Pt seen and examined in follow up. Pt is sitting on edge of bed, appears comfortable and in NAD. Pt states he feels good and reports breathing is better. He reports he is scheduled to get a repeat CT of his lungs for PE follow up. Pt denies chest pain. Denies change in appetite, N/V or abdominal pain. Pt continues to have loose stools, Cdiff positive on 9/24/24. Has no other issues/concerns. Vss     PHYSICAL EXAM:  Vitals:    09/26/24 1254   BP: 136/74   Pulse: 61   Resp: 18   Temp: 97.5 °F (36.4 °C)   SpO2: 96%     GENERAL HEALTH:well developed, well nourished, in no apparent distress   LINES, TUBES, DRAINS:  none  SKIN: no rashes, no suspicious lesions  WOUND: see wound assessment,   EYES: PERRLA, EOMI, sclera anicteric, conjunctiva normal; there is no nystagmus, no drainage from eyes  HENT: normocephalic; normal nose, no nasal drainage, mucous membranes pink, moist, pharynx no exudate, no visible cerumen.   NECK:supple, FROM; no JVD, no TMG, no carotid bruits   BREAST: deferred exam   RESPIRATORY:diminished at the bases   CARDIOVASCULAR: S1, S2 normal, RRR; no S3, no S4  ABDOMEN:  normal active BS+, soft, nondistended; no organomegaly, no masses; no bruits; nontender, no guarding, no rebound tenderness.  :no suprapubic distension  LYMPHATIC:no lymphedema  MUSCULOSKELETAL: no acute synovitis upper or lower extremity   EXTREMITIES/VASCULAR:edema right LE and edema left LE  NEUROLOGIC: Denies dizziness and lightheadedness   PSYCHIATRIC: alert and oriented x 3; affect appropriate     DIAGNOSTICS REVIEWED AT THIS VISIT:  Labs 09/16/24: wbc 9.13, hgb 10.5, plt 289, gluc 81, na 142, k 4.1, bun 15, creat 1.45   Labs 09/23/24: wbc 12.56, hgb 10.8, plt 378, gluc 72, na 144, k 4.5, bun 16, creat 1.42  Labs 09/24/24: Cdiff positive, UA negative    SEE PLAN BELOW    Cdiff  - positive for cdiff 9/24/24  - started  on vanco 125 mg PO q6 hr for 10 days, end date 10/05/24  - continue Florastor  - monitor    Leukocytosis   - UA with c/s negative   - F/u Chest Xray 9/24/24 negative  - Cdiff test positive on 9/25/24, started on oral vancomycin treatment  - recheck cbc and bmp on 9/25/24  - monitor weekly labs    New onset Afib  - Seen at [place] ED for Afib on 09/17/24  - started on metoprolol succinate 50 mg daily   - already on anticoagulation with Eliquis for PE   - cardiology consult in rehab   - monitor      Dizziness - improving   - check orthostatic BPs x 2 days   - recently diagnosed with Afib which could have contributed   - PT/OT   - monitor      PE  - Hospital work up in [place] revealed elevated D dimer and CT chest Large PT  - Initially treated with Lovenox, then converted to eliquis   - continue eliquis, currently 10 mg bid x 6 days, then 5 mg bid starting 09/18/24  - monitor      PNA   - Treated with IV antibiotics in hospital, then transitioned to Augmentin 875 - 125 BID x 7 days, end date 09/19/24  - Also developed a pleuritic pain which was initially treated with IV dilaudid then prn norco  - continue norco 5 q6h prn in rehab   - currently on RA in rehab   - pulm following in rehab   - weekly labs in rehab   - monitor      Cholelithiasis   - abdominal imaging in hospital with no obstruction or cholecystitis   - monitor      Hypertension  - continue lisinopril 20 mg daily      HLD   - continue simvastatin 40 mg hs      Hypothyroidism  - continue levothyroxine 100 mcg daily      Chronic Kidney Disease   - stable   - weekly labs in rehab      Pituitary Tumor   - continue hydrocortisone 20 mg bid   - monitor       Depression   Anxiety   - continue zoloft 100 mg daily   - started on xanax q12h prn for anxiety      Physical Deconditioning/Impaired mobility and ADLs/At risk for falling  - Fall Precautions  - PT/OT to evaluate and treat  - [facility] team to establish care plan meeting with patient and POA/family as  appropriate  - Anticipate DC on or before TBD; SW to assist patient/family w/ DC planning  - DC Plan:  TBD     Vitamins/supplements as r/t deficiencies  - [facility] RD to follow while in rehab; supplementation/diet as per [facility] RD  - May continue home supplements, cyanocobalamin 1000 mcg daily, multivitamin daily      LABS  - CBC/CMP weekly while in [facility]    FOLLOW UP APPOINTMENTS  No future appointments.     35 minutes spent w/ patient and staff, including but not limited to/ reviewing present status, needs, abilities with disciplines, reviewing medical records, vital signs, labs, completing medication reconciliation and entering orders for continued care in [facility]     Note to patient: The 21st Century Cures Act makes medical notes like these available to patients in the interest of transparency. However, this is a medical document intended as peer to peer communication. It is written in medical language and may contain abbreviations or verbiage that are unfamiliar. It may appear blunt or direct. Medical documents are intended to carry relevant information, facts as evident, and the clinical opinion of the practitioner who signs the document.     Lindsey Koch, APRN   09/26/24

## 2024-09-30 ENCOUNTER — SNF VISIT (OUTPATIENT)
Dept: INTERNAL MEDICINE CLINIC | Facility: SKILLED NURSING FACILITY | Age: 84
End: 2024-09-30

## 2024-09-30 ENCOUNTER — EXTERNAL FACILITY (OUTPATIENT)
Dept: PULMONOLOGY | Facility: CLINIC | Age: 84
End: 2024-09-30

## 2024-09-30 VITALS
SYSTOLIC BLOOD PRESSURE: 142 MMHG | OXYGEN SATURATION: 94 % | TEMPERATURE: 97 F | DIASTOLIC BLOOD PRESSURE: 74 MMHG | HEART RATE: 70 BPM | RESPIRATION RATE: 18 BRPM

## 2024-09-30 DIAGNOSIS — I10 HYPERTENSION, UNSPECIFIED TYPE: ICD-10-CM

## 2024-09-30 DIAGNOSIS — R53.81 PHYSICAL DECONDITIONING: ICD-10-CM

## 2024-09-30 DIAGNOSIS — I26.99 ACUTE PULMONARY EMBOLISM WITHOUT ACUTE COR PULMONALE, UNSPECIFIED PULMONARY EMBOLISM TYPE (HCC): Primary | ICD-10-CM

## 2024-09-30 DIAGNOSIS — I26.99 ACUTE PULMONARY EMBOLISM, UNSPECIFIED PULMONARY EMBOLISM TYPE, UNSPECIFIED WHETHER ACUTE COR PULMONALE PRESENT (HCC): ICD-10-CM

## 2024-09-30 DIAGNOSIS — M17.11 PRIMARY OSTEOARTHRITIS OF RIGHT KNEE: ICD-10-CM

## 2024-09-30 DIAGNOSIS — Z87.898 H/O: PITUITARY TUMOR: ICD-10-CM

## 2024-09-30 DIAGNOSIS — R91.8 LUNG MASS: ICD-10-CM

## 2024-09-30 DIAGNOSIS — J69.0 ASPIRATION PNEUMONIA, UNSPECIFIED ASPIRATION PNEUMONIA TYPE, UNSPECIFIED LATERALITY, UNSPECIFIED PART OF LUNG (HCC): ICD-10-CM

## 2024-09-30 DIAGNOSIS — G47.33 OSA (OBSTRUCTIVE SLEEP APNEA): ICD-10-CM

## 2024-09-30 DIAGNOSIS — A04.72 C. DIFFICILE COLITIS: ICD-10-CM

## 2024-09-30 DIAGNOSIS — F41.9 ANXIETY: ICD-10-CM

## 2024-09-30 DIAGNOSIS — F32.A DEPRESSION, UNSPECIFIED DEPRESSION TYPE: ICD-10-CM

## 2024-09-30 DIAGNOSIS — N18.9 CHRONIC KIDNEY DISEASE, UNSPECIFIED CKD STAGE: ICD-10-CM

## 2024-09-30 DIAGNOSIS — E78.5 HYPERLIPIDEMIA, UNSPECIFIED HYPERLIPIDEMIA TYPE: ICD-10-CM

## 2024-09-30 DIAGNOSIS — R19.7 DIARRHEA, UNSPECIFIED TYPE: Primary | ICD-10-CM

## 2024-09-30 DIAGNOSIS — J18.9 PNEUMONIA OF BOTH LOWER LOBES DUE TO INFECTIOUS ORGANISM: ICD-10-CM

## 2024-09-30 PROCEDURE — 99310 SBSQ NF CARE HIGH MDM 45: CPT

## 2024-09-30 NOTE — PROGRESS NOTES
Pulmonary Progress Note  SNF External Facility - [facility]     History of Present Illness:   Jeremy Adkins is a(n) 84 year old male with PMH notable for HTN, HLD, hypothyroid, JOHANNY, pulmonary nodule, who I am seeing for follow up at [facility]. Patient is from [place] and was hospitalized there 2 weeks ago. Initially presented to ER with right sided abdominal pain, found to have RUL and RLL pulmonary emboli and bibasilar infiltrates. Lower extremity Doppler US negative for DVT but found to have subacute thrombus in R small saphenous vein. Echocardiogram revealed mild LVH with EF 55-60% with no evidence of RV strain. This was his first VTE event. No recent travel or surgery but he does admit to sedentary lifestyle prior to hospitalization. He is on Eliquis. He has history of mild JOHANNY and did not tolerate CPAP in the past. He has history of pulmonary nodule. According to recent hospital notes, CT chest demonstrated partially calcified RUL mass measuring 4 cm. There is notation of partially calcified RUL [facility] is not [facility]. Patient smoked 0.5 ppd for 3 years and quit 50 years ago.      Interval history: Overall patient is doing well and he feels ready for discharge from rehab. Plans for discharge on 10/3. [place] staying in [place] until March 2025 and then will be returning to [place]. He has no complaints. No shortness of breath, cough, wheezing, or chest pain.       Review of Systems: Vision notable for visual impairment. Ears nose and throat notable for hearing impairment. Bowel normal. Bladder function normal. Thyroid disease. Anxiety and depression. No rash. Muscles and joints unremarkable.     Physical Exam:  /74, HR 70, RR 19, T 97.4, sat 94%   Constitutional: Obese, awake, alert, NAD  HEENT: Head NC/AT, PEERL, MMM  Cardio: Regular rate, irregular rhythm, S1S2, no murmurs  Respiratory: Thorax symmetrical with no labored breathing.  CTAB.  Extremities: No clubbing or cyanosis. No LE edema. No calf tenderness.  Neurologic: A&Ox3. No gross motor deficits.  Skin: Warm, dry.  Psych: Calm, cooperative. Pleasant affect.     Results:  -Labs 9/30/24: Wbc 10.96, Hgb 11.8, Plt 275, Cr 1.44, BUN 16, Na 144, K 4.0, CO2 29  -CXR 9/24/24: Prominent increased density right base without effusion.  Left lung without focal opacity or effusion. No pneumothorax. Chronic appearing linear interstitial prominence and hyperlucency likely chronic in nature.  The cardiac silhouette is not enlarged, with aortic calcifications.  Thoracic spondylosis.  Calcified granuloma or hamartoma right mid chest.  -Labs 9/23/24: Wbc 12.56, Hgb 10.8, Plt 378, Cr 1.42, BUN 16, Na 144, K 4.5, CO2 27  -Labs 9/6/24: Wbc 9.13, Hgb 10.5, Plt 289, Cr 1.45, BUN 15, Na 142, K 4.1, CO2 25  -CTA chest (outside hospital, per medical records): filling defect in R upper and R lower lobe arteries compatible with pulmonary embolism. Bibasilar infiltrates. Partially calcified mass in RUL measuring 4 cm.  -Echo (outside hospital, per medical records): Mild LVH, EF 55-60%, no evidence of RV strain.  -LE Doppler US (outside hospital, per medical records): No DVT. Subacute thrombosis involving R SSV.     Assessment and Plan:  1. Acute PE - on Eliquis. Only risk factor appears to be sedentary lifestyle. Will need minimum 3 months of full anticoagulation, consider indefinite given unprovoked PE. Discussed recommendation for him to follow in pulmonary clinic locally for now as he will be [place] and not returning to care team in [place] for 6 months.     Plan:  -Continue Eliquis  -Follow up in pulmonary clinic in 4 weeks - information provided     2. Pneumonia - completed Augmentin. Clinically improved. CXR with right basilar density.     Plan:  -CT chest - scheduled 10/16/24     3. Lung mass - 4 cm RUL partially calcified lung mass noted in recent OSH notes. Prior CT chest from 11/2018 ([facility]) with mention of  RUL partially calcified nodule, no size mentioned.     Plan:  -CT chest - scheduled 10/16/24     4. History of mild JOHANNY - did not tolerate CPAP.     Plan:  -Weight loss     Casey Lind PA-C  Pulmonary Medicine

## 2024-09-30 NOTE — PROGRESS NOTES
Jeremy Adkins  : 1940  Age 84 year old  male patient is admitted to [facility] for rehabilitation and strengthening.       [facility] Admission in [place] 24 - 24     Reason for visit: Follow up on PE and PNA      HPI  Pt is an 84-year-old male with a pmh of hypertension, hypothyroidism, GERD and anxiety who presented to [facility] ER in [place] for right sided abdominal pain. He reported right sided lower quadrant pain with inspiration and described it as sharp with deep inspiration. Pt recently underwent stress test, Holter monitor and echocardiogram that he was told were normal. [facility] CT of abdomen was obtained which showed renal cyst and cholelithiasis with minimal gallbladder distension. He was found to have mild dehydration with mild elevated white blood count and diminished renal function. Pt was then transferred to [facility] due to elevated d-dimer and further evaluation.  At [place] CT chest revealed large PE and pneumonia. Abdominal imaging was also obtained d/t c/o vomiting and abdominal pan which revealed gallstones without obstruction or cholecystitis. Pt was treated with Lovenox for anticoagulation and IV abx. Eventually converted Lovenox to Eliquis and discharged pt to [facility] for rehab and strengthening    Pt seen and examined in follow up. Pt is sitting up in bed, appears comfortable in NAD. Pt reports feeling well and diarrhea has completely resolved. Pt has no appetite change, n.v or abdominal pain. Also denies chest pain or sob. Reports doing very well in PT and looks forward to his discharge date. He denies having pan of any sort. No issues/concerns. Vss     ALLERGIES:  No Known Allergies    IMMUNIZATIONS  Immunization History   Administered Date(s) Administered    Covid-19 Vaccine Moderna 100 mcg/0.5 ml 2020, 2021        CODE STATUS:  Full Code    ADVANCED CARE PLANNING TEAM: Will need family care plan    CURRENT MEDICATIONS -  reviewed and updated on [facility] EMR     SUBJECTIVE    Pt seen and examined in follow up. Pt is sitting up in bed, appears comfortable in NAD. Pt reports feeling well and diarrhea has completely resolved. Pt has no appetite change, n.v or abdominal pain. Also denies chest pain or sob. Reports doing very well in PT and looks forward to his discharge date. He denies having pan of any sort. No issues/concerns. Vss     PHYSICAL EXAM:  Vitals:    09/30/24 1230   BP: 142/74   Pulse: 70   Resp: 18   Temp: 97.4 °F (36.3 °C)   SpO2: 94%      GENERAL HEALTH:well developed, well nourished, in no apparent distress   LINES, TUBES, DRAINS:  none  SKIN: no rashes, no suspicious lesions  WOUND: see wound assessment,   EYES: PERRLA, EOMI, sclera anicteric, conjunctiva normal; there is no nystagmus, no drainage from eyes  HENT: normocephalic; normal nose, no nasal drainage, mucous membranes pink, moist, pharynx no exudate, no visible cerumen.   NECK:supple, FROM; no JVD, no TMG, no carotid bruits   BREAST: deferred exam   RESPIRATORY:diminished at the bases   CARDIOVASCULAR: S1, S2 normal, RRR; no S3, no S4  ABDOMEN:  normal active BS+, soft, nondistended; no organomegaly, no masses; no bruits; nontender, no guarding, no rebound tenderness.  :no suprapubic distension  LYMPHATIC:no lymphedema  MUSCULOSKELETAL: no acute synovitis upper or lower extremity   EXTREMITIES/VASCULAR:edema right LE and edema left LE  NEUROLOGIC: Denies dizziness and lightheadedness   PSYCHIATRIC: alert and oriented x 3; affect appropriate     DIAGNOSTICS REVIEWED AT THIS VISIT:  Labs 09/16/24: wbc 9.13, hgb 10.5, plt 289, gluc 81, na 142, k 4.1, bun 15, creat 1.45   Labs 09/23/24: wbc 12.56, hgb 10.8, plt 378, gluc 72, na 144, k 4.5, bun 16, creat 1.42  Labs 09/24/24: Cdiff positive, UA negative  Labs 09/30/24: wbc 10.96, hgb 11.8, plt 275, gluc 75, na 144, k 4.0, bun 16, creat 1.44       SEE PLAN BELOW    Cdiff - resolving   - positive for cdiff 9/24/24  -  started on vanco 125 mg PO q6 hr for 10 days, end date 10/05/24  - continue Florastor  - diarrhea has resolved, will continue vanco til 10/05/24      Leukocytosis - improving   - UA with c/s negative   - F/u Chest Xray 9/24/24 negative  - Cdiff test positive on 9/25/24, started on oral vancomycin treatment  - recheck cbc and bmp on 9/25/24  - monitor weekly labs     New onset Afib  - Seen at [place] ED for Afib on 09/17/24  - started on metoprolol succinate 50 mg daily   - already on anticoagulation with Eliquis for PE   - cardiology consult in rehab   - monitor      Dizziness - improving   - check orthostatic BPs x 2 days   - recently diagnosed with Afib which could have contributed   - PT/OT   - monitor      PE  - Hospital work up in [place] revealed elevated D dimer and CT chest Large PT  - Initially treated with Lovenox, then converted to eliquis   - continue eliquis, currently 10 mg bid x 6 days, then 5 mg bid starting 09/18/24  - monitor      PNA   - Treated with IV antibiotics in hospital, then transitioned to Augmentin 875 - 125 BID x 7 days, end date 09/19/24  - Also developed a pleuritic pain which was initially treated with IV dilaudid then prn norco  - continue norco 5 q6h prn in rehab   - currently on RA in rehab   - pulm following in rehab   - weekly labs in rehab   - monitor      Cholelithiasis   - abdominal imaging in hospital with no obstruction or cholecystitis   - monitor      Hypertension  - continue lisinopril 20 mg daily      HLD   - continue simvastatin 40 mg hs      Hypothyroidism  - continue levothyroxine 100 mcg daily      Chronic Kidney Disease   - stable   - weekly labs in rehab      Pituitary Tumor   - continue hydrocortisone 20 mg bid   - monitor       Depression   Anxiety   - continue zoloft 100 mg daily   - started on xanax q12h prn for anxiety      Physical Deconditioning/Impaired mobility and ADLs/At risk for falling  - Fall Precautions  - PT/OT to evaluate and treat  - [facility] team  to establish care plan meeting with patient and POA/family as appropriate  - Anticipate DC on or before 10/03/24; SW to assist patient/family w/ DC planning  - DC Plan:  10/03/24     Vitamins/supplements as r/t deficiencies  - [facility] RD to follow while in rehab; supplementation/diet as per [facility]  - May continue home supplements, cyanocobalamin 1000 mcg daily, multivitamin daily      LABS  - CBC/CMP weekly while in [facility]    FOLLOW UP APPOINTMENTS  Future Appointments   Date Time Provider Department Center   10/16/2024  8:00 AM [facility] CT 3 [facility]      35 minutes spent w/ patient and staff, including but not limited to/ reviewing present status, needs, abilities with disciplines, reviewing medical records, vital signs, labs, completing medication reconciliation and entering orders for continued care in [facility]     Note to patient: The 21st Century Cures Act makes medical notes like these available to patients in the interest of transparency. However, this is a medical document intended as peer to peer communication. It is written in medical language and may contain abbreviations or verbiage that are unfamiliar. It may appear blunt or direct. Medical documents are intended to carry relevant information, facts as evident, and the clinical opinion of the practitioner who signs the document.     Lindsey Koch, APRN   09/30/24

## 2024-10-03 ENCOUNTER — SNF DISCHARGE (OUTPATIENT)
Dept: INTERNAL MEDICINE CLINIC | Facility: SKILLED NURSING FACILITY | Age: 84
End: 2024-10-03

## 2024-10-03 VITALS
DIASTOLIC BLOOD PRESSURE: 79 MMHG | SYSTOLIC BLOOD PRESSURE: 160 MMHG | RESPIRATION RATE: 18 BRPM | TEMPERATURE: 97 F | HEART RATE: 61 BPM | OXYGEN SATURATION: 97 %

## 2024-10-03 DIAGNOSIS — R53.81 PHYSICAL DECONDITIONING: ICD-10-CM

## 2024-10-03 DIAGNOSIS — E78.5 HYPERLIPIDEMIA, UNSPECIFIED HYPERLIPIDEMIA TYPE: ICD-10-CM

## 2024-10-03 DIAGNOSIS — Z87.898 H/O: PITUITARY TUMOR: ICD-10-CM

## 2024-10-03 DIAGNOSIS — J69.0 ASPIRATION PNEUMONIA, UNSPECIFIED ASPIRATION PNEUMONIA TYPE, UNSPECIFIED LATERALITY, UNSPECIFIED PART OF LUNG (HCC): ICD-10-CM

## 2024-10-03 DIAGNOSIS — A04.72 C. DIFFICILE COLITIS: ICD-10-CM

## 2024-10-03 DIAGNOSIS — N18.9 CHRONIC KIDNEY DISEASE, UNSPECIFIED CKD STAGE: ICD-10-CM

## 2024-10-03 DIAGNOSIS — F32.A DEPRESSION, UNSPECIFIED DEPRESSION TYPE: ICD-10-CM

## 2024-10-03 DIAGNOSIS — I10 HYPERTENSION, UNSPECIFIED TYPE: ICD-10-CM

## 2024-10-03 DIAGNOSIS — R42 DIZZINESS: ICD-10-CM

## 2024-10-03 DIAGNOSIS — R19.7 DIARRHEA, UNSPECIFIED TYPE: ICD-10-CM

## 2024-10-03 DIAGNOSIS — I26.99 ACUTE PULMONARY EMBOLISM, UNSPECIFIED PULMONARY EMBOLISM TYPE, UNSPECIFIED WHETHER ACUTE COR PULMONALE PRESENT (HCC): Primary | ICD-10-CM

## 2024-10-03 DIAGNOSIS — M17.11 PRIMARY OSTEOARTHRITIS OF RIGHT KNEE: ICD-10-CM

## 2024-10-03 PROCEDURE — 99316 NF DSCHRG MGMT 30 MIN+: CPT

## 2024-10-03 NOTE — PROGRESS NOTES
Jeremy FIORE Lucille, 8/1/1940, 84 year old, male is being discharged from [facility] to home wit       DISCHARGE SUMMARY    Date of Admission to [facility]: 09/12/24    Date of Discharge from [facility]: 10/03/24                                 Admitting Diagnoses: PE and PNA     Reason for Admission to [facility]: Rehabilitation and strengthening      PHYSICAL EXAM:  Vitals:    10/03/24 0958   BP: 160/79   Pulse: 61   Resp: 18   Temp: 97.2 °F (36.2 °C)   SpO2: 97%      GENERAL HEALTH:well developed, well nourished, in no apparent distress   LINES, TUBES, DRAINS:  none  SKIN: no rashes, no suspicious lesions  WOUND: see wound assessment,   EYES: PERRLA, EOMI, sclera anicteric, conjunctiva normal; there is no nystagmus, no drainage from eyes  HENT: normocephalic; normal nose, no nasal drainage, mucous membranes pink, moist, pharynx no exudate, no visible cerumen.   NECK:supple, FROM; no JVD, no TMG, no carotid bruits   BREAST: deferred exam   RESPIRATORY:diminished at the bases   CARDIOVASCULAR: S1, S2 normal, RRR; no S3, no S4  ABDOMEN:  normal active BS+, soft, nondistended; no organomegaly, no masses; no bruits; nontender, no guarding, no rebound tenderness.  :no suprapubic distension  LYMPHATIC:no lymphedema  MUSCULOSKELETAL: no acute synovitis upper or lower extremity   EXTREMITIES/VASCULAR:edema right LE and edema left LE  NEUROLOGIC: Denies dizziness and lightheadedness   PSYCHIATRIC: alert and oriented x 3; affect appropriate     CURRENT MANAGEMENT AT [facility]     Labs 09/16/24: wbc 9.13, hgb 10.5, plt 289, gluc 81, na 142, k 4.1, bun 15, creat 1.45   Labs 09/23/24: wbc 12.56, hgb 10.8, plt 378, gluc 72, na 144, k 4.5, bun 16, creat 1.42  Labs 09/24/24: Cdiff positive, UA negative  Labs 09/30/24: wbc 10.96, hgb 11.8, plt 275, gluc 75, na 144, k 4.0, bun 16, creat 1.44    Cdiff - resolved  - positive for cdiff 9/24/24  - started on vanco 125 mg PO q6 hr for 10 days, end date 10/05/24  - continue  Florastor  - diarrhea has resolved, will continue vanco til 10/05/24      Leukocytosis - improving   - UA with c/s negative   - F/u Chest Xray 9/24/24 negative  - Cdiff test positive on 9/25/24, started on oral vancomycin treatment  - recheck cbc and bmp on 9/25/24  - monitor weekly labs     New onset Afib  - Seen at [facility] for Afib on 09/17/24  - started on metoprolol succinate 50 mg daily   - already on anticoagulation with Eliquis for PE   - cardiology consult in rehab   - monitor      Dizziness - improving   - check orthostatic BPs x 2 days   - recently diagnosed with Afib which could have contributed   - PT/OT   - monitor      PE  - Hospital work up in [place] revealed elevated D dimer and CT chest Large PT  - Initially treated with Lovenox, then converted to eliquis   - continue eliquis, currently 10 mg bid x 6 days, then 5 mg bid starting 09/18/24  - monitor      PNA   - Treated with IV antibiotics in hospital, then transitioned to Augmentin 875 - 125 BID x 7 days, end date 09/19/24  - Also developed a pleuritic pain which was initially treated with IV dilaudid then prn norco  - continue norco 5 q6h prn in rehab   - currently on RA in rehab   - pulm following in rehab   - weekly labs in rehab   - monitor      Cholelithiasis   - abdominal imaging in hospital with no obstruction or cholecystitis   - monitor      Hypertension  - continue lisinopril 20 mg daily      HLD   - continue simvastatin 40 mg hs      Hypothyroidism  - continue levothyroxine 100 mcg daily      Chronic Kidney Disease   - stable   - weekly labs in rehab      Pituitary Tumor   - continue hydrocortisone 20 mg bid   - monitor       Depression   Anxiety   - continue zoloft 100 mg daily   - started on xanax q12h prn for anxiety      Physical Deconditioning/Impaired mobility and ADLs/At risk for falling  - Fall Precautions  - PT/OT to evaluate and treat  - [facility] team to establish care plan meeting with patient and POA/family as appropriate  -  Anticipate DC on or before 10/03/24; SW to assist patient/family w/ DC planning  - DC Plan:  10/03/24     Vitamins/supplements as r/t deficiencies  - [facility] to follow while in rehab; supplementation/diet as per [facility]  - May continue home supplements, cyanocobalamin 1000 mcg daily, multivitamin daily      Medication Reconciliation Completed:  Yes - All medications and side effects reviewed with patient    FOLLOW UP APPOINTMENTS  Future Appointments   Date Time Provider Department Center   10/16/2024  8:00 AM [facility] [place]      60 minutes spent w/ patient and staff, including but not limited to reviewing present status, needs, abilities with disciplines, reviewing medical records, vital signs, labs, completing med reconciliation and entering orders for continued care in [facility] and at discharge     Note to patient: The 21st Century Cures Act makes medical notes like these available to patients in the interest of transparency. However, this is a medical document intended as peer to peer communication. It is written in medical language and may contain abbreviations or verbiage that are unfamiliar. It may appear blunt or direct. Medical documents are intended to carry relevant information, facts as evident, and the clinical opinion of the practitioner who signs the document.     Lindsey Koch, APRN   10/3/2024

## 2024-10-16 ENCOUNTER — HOSPITAL ENCOUNTER (OUTPATIENT)
Dept: CT IMAGING | Facility: HOSPITAL | Age: 84
Discharge: HOME | End: 2024-10-16
Attending: PHYSICIAN ASSISTANT
Payer: MEDICARE

## 2024-10-16 DIAGNOSIS — R91.8 LUNG MASS: ICD-10-CM

## 2024-10-16 PROCEDURE — 71250 CT THORAX DX C-: CPT | Performed by: PHYSICIAN ASSISTANT

## 2024-10-17 ENCOUNTER — TELEPHONE (OUTPATIENT)
Dept: PULMONOLOGY | Facility: CLINIC | Age: 84
End: 2024-10-17

## 2024-10-17 DIAGNOSIS — Z86.711 HISTORY OF PULMONARY EMBOLISM: Primary | ICD-10-CM

## 2024-10-17 DIAGNOSIS — R93.89 ABNORMAL CT OF THE CHEST: ICD-10-CM

## 2024-10-17 NOTE — TELEPHONE ENCOUNTER
You may let Makenzie know that I spoke with Macrina earlier today and provided results/recommendations.

## 2024-10-21 ENCOUNTER — APPOINTMENT (OUTPATIENT)
Dept: INTERNAL MEDICINE | Age: 84
End: 2024-10-21

## 2024-10-21 VITALS
HEIGHT: 73 IN | TEMPERATURE: 98.1 F | BODY MASS INDEX: 30.05 KG/M2 | OXYGEN SATURATION: 97 % | SYSTOLIC BLOOD PRESSURE: 97 MMHG | DIASTOLIC BLOOD PRESSURE: 65 MMHG | HEART RATE: 94 BPM | RESPIRATION RATE: 16 BRPM | WEIGHT: 226.74 LBS

## 2024-10-21 DIAGNOSIS — E23.0: ICD-10-CM

## 2024-10-21 DIAGNOSIS — Z00.00 MEDICARE ANNUAL WELLNESS VISIT, INITIAL: Primary | ICD-10-CM

## 2024-10-21 DIAGNOSIS — F41.1 GENERALIZED ANXIETY DISORDER: ICD-10-CM

## 2024-10-21 DIAGNOSIS — I48.91 ATRIAL FIBRILLATION, UNSPECIFIED TYPE  (CMD): ICD-10-CM

## 2024-10-21 DIAGNOSIS — Z86.711 PERSONAL HISTORY OF PE (PULMONARY EMBOLISM): ICD-10-CM

## 2024-10-21 DIAGNOSIS — I95.2 HYPOTENSION DUE TO DRUGS: ICD-10-CM

## 2024-10-21 DIAGNOSIS — J44.9 CHRONIC OBSTRUCTIVE PULMONARY DISEASE, UNSPECIFIED COPD TYPE  (CMD): ICD-10-CM

## 2024-10-21 DIAGNOSIS — N18.32 STAGE 3B CHRONIC KIDNEY DISEASE  (CMD): ICD-10-CM

## 2024-10-21 RX ORDER — SIMVASTATIN 40 MG
40 TABLET ORAL NIGHTLY
Qty: 90 TABLET | Refills: 1 | Status: SHIPPED | OUTPATIENT
Start: 2024-10-21

## 2024-10-21 RX ORDER — TRIAMCINOLONE ACETONIDE 1 MG/G
CREAM TOPICAL
COMMUNITY

## 2024-10-21 RX ORDER — METOPROLOL SUCCINATE 25 MG/1
1 TABLET, EXTENDED RELEASE ORAL DAILY
COMMUNITY
Start: 2024-10-08

## 2024-10-21 RX ORDER — HYDROCORTISONE 25 MG/G
CREAM TOPICAL
COMMUNITY

## 2024-10-21 RX ORDER — TRIAMCINOLONE ACETONIDE 40 MG/ML
2 INJECTION, SUSPENSION INTRA-ARTICULAR; INTRAMUSCULAR
COMMUNITY

## 2024-10-21 RX ORDER — ECONAZOLE NITRATE 10 MG/G
CREAM TOPICAL
COMMUNITY

## 2024-10-21 RX ORDER — LISINOPRIL 20 MG/1
20 TABLET ORAL DAILY
Qty: 90 TABLET | Refills: 1 | Status: SHIPPED | OUTPATIENT
Start: 2024-10-21

## 2024-10-21 RX ORDER — ALPRAZOLAM 0.25 MG/1
0.25 TABLET ORAL DAILY PRN
Qty: 30 TABLET | Refills: 0 | Status: SHIPPED | OUTPATIENT
Start: 2024-10-21

## 2024-10-21 RX ORDER — BUSPIRONE HYDROCHLORIDE 7.5 MG/1
TABLET ORAL
COMMUNITY
Start: 2024-06-03 | End: 2024-10-21 | Stop reason: ALTCHOICE

## 2024-10-21 RX ORDER — ALPRAZOLAM 0.25 MG/1
TABLET ORAL
COMMUNITY
End: 2024-10-21 | Stop reason: SDUPTHER

## 2024-10-21 RX ORDER — APIXABAN 5 MG/1
5 TABLET, FILM COATED ORAL
COMMUNITY
Start: 2024-10-17

## 2024-10-21 RX ORDER — KETOCONAZOLE 20 MG/G
CREAM TOPICAL
COMMUNITY

## 2024-10-21 RX ORDER — SERTRALINE HYDROCHLORIDE 100 MG/1
100 TABLET, FILM COATED ORAL DAILY
Qty: 90 TABLET | Refills: 1 | Status: SHIPPED | OUTPATIENT
Start: 2024-10-21

## 2024-10-21 ASSESSMENT — PATIENT HEALTH QUESTIONNAIRE - PHQ9
SUM OF ALL RESPONSES TO PHQ9 QUESTIONS 1 AND 2: 4
2. FEELING DOWN, DEPRESSED OR HOPELESS: MORE THAN HALF THE DAYS
9. THOUGHTS THAT YOU WOULD BE BETTER OFF DEAD, OR OF HURTING YOURSELF: SEVERAL DAYS
8. MOVING OR SPEAKING SO SLOWLY THAT OTHER PEOPLE COULD HAVE NOTICED. OR THE OPPOSITE, BEING SO FIGETY OR RESTLESS THAT YOU HAVE BEEN MOVING AROUND A LOT MORE THAN USUAL: NOT AT ALL
CLINICAL INTERPRETATION OF PHQ2 SCORE: FURTHER SCREENING NEEDED
10. IF YOU CHECKED OFF ANY PROBLEMS, HOW DIFFICULT HAVE THESE PROBLEMS MADE IT FOR YOU TO DO YOUR WORK, TAKE CARE OF THINGS AT HOME, OR GET ALONG WITH OTHER PEOPLE: NOT DIFFICULT AT ALL
4. FEELING TIRED OR HAVING LITTLE ENERGY: SEVERAL DAYS
SUM OF ALL RESPONSES TO PHQ9 QUESTIONS 1 AND 2: 4
5. POOR APPETITE, WEIGHT LOSS, OR OVEREATING: NOT AT ALL
1. LITTLE INTEREST OR PLEASURE IN DOING THINGS: MORE THAN HALF THE DAYS
SUM OF ALL RESPONSES TO PHQ QUESTIONS 1-9: 7
3. TROUBLE FALLING OR STAYING ASLEEP OR SLEEPING TOO MUCH: NOT AT ALL
7. TROUBLE CONCENTRATING ON THINGS, SUCH AS READING THE NEWSPAPER OR WATCHING TELEVISION: NOT AT ALL
CLINICAL INTERPRETATION OF PHQ9 SCORE: MILD DEPRESSION
6. FEELING BAD ABOUT YOURSELF - OR THAT YOU ARE A FAILURE OR HAVE LET YOURSELF OR YOUR FAMILY DOWN: SEVERAL DAYS

## 2024-10-21 ASSESSMENT — ACTIVITIES OF DAILY LIVING (ADL)
MANAGING FINANCES: INDEPENDENT
STIL DRIVING: YES
GROCERY SHOPPING: INDEPENDENT
ADL_BEFORE_ADMISSION: INDEPENDENT
EATING: INDEPENDENT
NEEDS ASSISTANCE WITH FOOD: INDEPENDENT
USING TELEPHONE: INDEPENDENT
RECENT_DECLINE_ADL: NO
PREPARING MEALS: INDEPENDENT
DOING HOUSEWORK: INDEPENDENT
ADL_SHORT_OF_BREATH: YES
NEEDS_ASSIST: NO
SENSORY_SUPPORT_DEVICES: EYEGLASSES
TAKING MEDICATION: INDEPENDENT
USING TRANSPORTATION: INDEPENDENT
ADL_SCORE: 12
PILL BOX USED: YES

## 2024-10-21 ASSESSMENT — MINI COG
TOTAL SCORE: 1
PATIENT ABLE TO FILL IN THE CLOCK FACE WITH 10 MINUTES PAST 11 O'CLOCK?: NO, CLOCK IS NOT CORRECT
PATIENT WAS GIVEN REPEAT BACK WORDS FROM VERSION: 1 - BANANA SUNRISE CHAIR
PATIENT ABLE TO REPEAT THE 3 WORDS GIVEN PREVIOUSLY?: WAS ABLE TO REPEAT BACK 1 WORD CORRECTLY

## 2024-10-21 ASSESSMENT — ENCOUNTER SYMPTOMS
LIGHT-HEADEDNESS: 1
SHORTNESS OF BREATH: 1
NERVOUS/ANXIOUS: 1
EYES NEGATIVE: 1
CONSTITUTIONAL NEGATIVE: 1
DIZZINESS: 1
GASTROINTESTINAL NEGATIVE: 1
ENDOCRINE NEGATIVE: 1

## 2024-10-21 ASSESSMENT — PAIN SCALES - GENERAL: PAINLEVEL: 0

## 2024-12-02 ENCOUNTER — OFFICE VISIT (OUTPATIENT)
Dept: INTERNAL MEDICINE | Age: 84
End: 2024-12-02

## 2024-12-02 ENCOUNTER — HOSPITAL ENCOUNTER (OUTPATIENT)
Dept: GENERAL RADIOLOGY | Age: 84
Discharge: HOME | End: 2024-12-02

## 2024-12-02 VITALS
TEMPERATURE: 97 F | HEIGHT: 72 IN | RESPIRATION RATE: 16 BRPM | OXYGEN SATURATION: 96 % | HEART RATE: 64 BPM | DIASTOLIC BLOOD PRESSURE: 67 MMHG | WEIGHT: 234.68 LBS | BODY MASS INDEX: 31.79 KG/M2 | SYSTOLIC BLOOD PRESSURE: 132 MMHG

## 2024-12-02 DIAGNOSIS — M53.3 SACRAL BACK PAIN: Primary | ICD-10-CM

## 2024-12-02 DIAGNOSIS — I48.91 ATRIAL FIBRILLATION, UNSPECIFIED TYPE  (CMD): ICD-10-CM

## 2024-12-02 DIAGNOSIS — M53.3 SACRAL BACK PAIN: ICD-10-CM

## 2024-12-02 PROCEDURE — 72100 X-RAY EXAM L-S SPINE 2/3 VWS: CPT

## 2024-12-02 PROCEDURE — 72220 X-RAY EXAM SACRUM TAILBONE: CPT

## 2024-12-02 PROCEDURE — 99213 OFFICE O/P EST LOW 20 MIN: CPT | Performed by: STUDENT IN AN ORGANIZED HEALTH CARE EDUCATION/TRAINING PROGRAM

## 2024-12-02 RX ORDER — METOPROLOL SUCCINATE 25 MG/1
25 TABLET, EXTENDED RELEASE ORAL DAILY
Qty: 90 TABLET | Refills: 3 | Status: SHIPPED | OUTPATIENT
Start: 2024-12-02 | End: 2024-12-05 | Stop reason: SDUPTHER

## 2024-12-02 RX ORDER — APIXABAN 5 MG/1
5 TABLET, FILM COATED ORAL EVERY 12 HOURS SCHEDULED
Qty: 180 TABLET | Refills: 3 | Status: SHIPPED | OUTPATIENT
Start: 2024-12-02 | End: 2024-12-05 | Stop reason: SDUPTHER

## 2024-12-02 ASSESSMENT — ENCOUNTER SYMPTOMS: CONSTITUTIONAL NEGATIVE: 1

## 2024-12-02 ASSESSMENT — PATIENT HEALTH QUESTIONNAIRE - PHQ9
2. FEELING DOWN, DEPRESSED OR HOPELESS: NOT AT ALL
CLINICAL INTERPRETATION OF PHQ2 SCORE: NO FURTHER SCREENING NEEDED
SUM OF ALL RESPONSES TO PHQ9 QUESTIONS 1 AND 2: 0
SUM OF ALL RESPONSES TO PHQ9 QUESTIONS 1 AND 2: 0
1. LITTLE INTEREST OR PLEASURE IN DOING THINGS: NOT AT ALL

## 2024-12-02 ASSESSMENT — PAIN SCALES - GENERAL: PAINLEVEL: 9

## 2024-12-05 ENCOUNTER — TELEPHONE (OUTPATIENT)
Dept: INTERNAL MEDICINE | Age: 84
End: 2024-12-05

## 2024-12-05 DIAGNOSIS — I10 ESSENTIAL HYPERTENSION, BENIGN: Primary | ICD-10-CM

## 2024-12-05 DIAGNOSIS — M53.3 SACRAL BACK PAIN: Primary | ICD-10-CM

## 2024-12-05 DIAGNOSIS — I48.91 ATRIAL FIBRILLATION, UNSPECIFIED TYPE  (CMD): ICD-10-CM

## 2024-12-05 RX ORDER — APIXABAN 5 MG/1
5 TABLET, FILM COATED ORAL EVERY 12 HOURS SCHEDULED
Qty: 180 TABLET | Refills: 3 | Status: CANCELLED | OUTPATIENT
Start: 2024-12-05

## 2024-12-05 RX ORDER — METOPROLOL SUCCINATE 25 MG/1
25 TABLET, EXTENDED RELEASE ORAL DAILY
Qty: 90 TABLET | Refills: 3 | Status: SHIPPED | OUTPATIENT
Start: 2024-12-05 | End: 2025-01-09 | Stop reason: DRUGHIGH

## 2024-12-05 RX ORDER — APIXABAN 5 MG/1
5 TABLET, FILM COATED ORAL EVERY 12 HOURS SCHEDULED
Qty: 180 TABLET | Refills: 3 | Status: SHIPPED | OUTPATIENT
Start: 2024-12-05

## 2024-12-05 RX ORDER — METOPROLOL SUCCINATE 25 MG/1
25 TABLET, EXTENDED RELEASE ORAL DAILY
Qty: 90 TABLET | Refills: 3 | Status: CANCELLED | OUTPATIENT
Start: 2024-12-05

## 2024-12-06 ENCOUNTER — TELEPHONE (OUTPATIENT)
Dept: NEUROSURGERY | Age: 84
End: 2024-12-06

## 2024-12-09 ENCOUNTER — E-ADVICE (OUTPATIENT)
Dept: NEUROSURGERY | Age: 84
End: 2024-12-09

## 2024-12-10 ENCOUNTER — TELEPHONE (OUTPATIENT)
Dept: INTERNAL MEDICINE | Age: 84
End: 2024-12-10

## 2024-12-16 ENCOUNTER — TELEPHONE (OUTPATIENT)
Dept: NEUROSURGERY | Age: 84
End: 2024-12-16

## 2024-12-17 ENCOUNTER — ANCILLARY PROCEDURE (OUTPATIENT)
Dept: CARDIOLOGY | Age: 84
End: 2024-12-17
Attending: INTERNAL MEDICINE

## 2024-12-17 ENCOUNTER — APPOINTMENT (OUTPATIENT)
Dept: CARDIOLOGY | Age: 84
End: 2024-12-17
Attending: STUDENT IN AN ORGANIZED HEALTH CARE EDUCATION/TRAINING PROGRAM

## 2024-12-17 VITALS
BODY MASS INDEX: 31.46 KG/M2 | HEIGHT: 72 IN | OXYGEN SATURATION: 93 % | DIASTOLIC BLOOD PRESSURE: 72 MMHG | SYSTOLIC BLOOD PRESSURE: 120 MMHG | HEART RATE: 80 BPM | WEIGHT: 232.25 LBS

## 2024-12-17 DIAGNOSIS — I48.0 PAROXYSMAL ATRIAL FIBRILLATION  (CMD): ICD-10-CM

## 2024-12-17 DIAGNOSIS — I49.3 PVC (PREMATURE VENTRICULAR CONTRACTION): Primary | ICD-10-CM

## 2024-12-17 LAB
ATRIAL RATE (BPM): 70
P AXIS (DEGREES): 68
PR-INTERVAL (MSEC): 260
QRS-INTERVAL (MSEC): 74
QT-INTERVAL (MSEC): 412
QTC: 445
R AXIS (DEGREES): 32
REPORT TEXT: NORMAL
T AXIS (DEGREES): 64
VENTRICULAR RATE EKG/MIN (BPM): 70

## 2024-12-17 PROCEDURE — 99205 OFFICE O/P NEW HI 60 MIN: CPT | Performed by: INTERNAL MEDICINE

## 2024-12-17 PROCEDURE — 93000 ELECTROCARDIOGRAM COMPLETE: CPT | Performed by: INTERNAL MEDICINE

## 2024-12-17 SDOH — HEALTH STABILITY: PHYSICAL HEALTH: ON AVERAGE, HOW MANY DAYS PER WEEK DO YOU ENGAGE IN MODERATE TO STRENUOUS EXERCISE (LIKE A BRISK WALK)?: 0 DAYS

## 2024-12-17 SDOH — HEALTH STABILITY: PHYSICAL HEALTH: ON AVERAGE, HOW MANY MINUTES DO YOU ENGAGE IN EXERCISE AT THIS LEVEL?: 0 MIN

## 2024-12-17 ASSESSMENT — PATIENT HEALTH QUESTIONNAIRE - PHQ9
1. LITTLE INTEREST OR PLEASURE IN DOING THINGS: NOT AT ALL
SUM OF ALL RESPONSES TO PHQ9 QUESTIONS 1 AND 2: 0
SUM OF ALL RESPONSES TO PHQ9 QUESTIONS 1 AND 2: 0
IS THE PATIENT ABLE TO COGNITIVELY COMPLETE THE PHQ9: NO
CLINICAL INTERPRETATION OF PHQ2 SCORE: NO FURTHER SCREENING NEEDED
2. FEELING DOWN, DEPRESSED OR HOPELESS: NOT AT ALL

## 2024-12-20 ENCOUNTER — ANCILLARY PROCEDURE (OUTPATIENT)
Dept: CARDIOLOGY | Age: 84
End: 2024-12-20
Attending: INTERNAL MEDICINE

## 2024-12-20 DIAGNOSIS — I49.3 PVC (PREMATURE VENTRICULAR CONTRACTION): ICD-10-CM

## 2024-12-20 DIAGNOSIS — I48.0 PAROXYSMAL ATRIAL FIBRILLATION  (CMD): ICD-10-CM

## 2024-12-20 LAB
AORTIC VALVE AREA (AVA): 0.67
AV PEAK GRADIENT (AVPG): 5
AV PEAK VELOCITY (AVPV): 1.17
AV STENOSIS SEVERITY TEXT: NORMAL
AVI LVOT PEAK GRADIENT (LVOTMG): 1.2
E WAVE DECELARATION TIME (MDT): 9.81
INTERVENTRICULAR SEPTUM IN END DIASTOLE (IVSD): 2.62
LEFT INTERNAL DIMENSION IN SYSTOLE (LVSD): 1.2
LEFT VENTRICULAR INTERNAL DIMENSION IN DIASTOLE (LVDD): 2.9
LEFT VENTRICULAR POSTERIOR WALL IN END DIASTOLE (LVPW): 2.5
LV EF: NORMAL %
MV E TISSUE VEL MED (MESV): 9.76
MV E WAVE VEL/E TISSUE VEL MED(MSR): 6.8
MV PEAK A VELOCITY (MVPAV): 254
MV PEAK E VELOCITY (MVPEV): 0.56
RV END SYSTOLIC LONGITUDINAL STRAIN FREE WALL (RVGS): 2.3
TRICUSPID VALVE PEAK REGURGITATION VELOCITY (TRPV): 3.3
TV ESTIMATED RIGHT ARTERIAL PRESSURE (RAP): 10.9

## 2024-12-20 PROCEDURE — 93306 TTE W/DOPPLER COMPLETE: CPT | Performed by: INTERNAL MEDICINE

## 2024-12-20 PROCEDURE — 93356 MYOCRD STRAIN IMG SPCKL TRCK: CPT | Performed by: INTERNAL MEDICINE

## 2025-01-09 ENCOUNTER — TELEPHONE (OUTPATIENT)
Dept: CARDIOLOGY | Age: 85
End: 2025-01-09

## 2025-01-09 ENCOUNTER — APPOINTMENT (OUTPATIENT)
Dept: PULMONOLOGY | Age: 85
End: 2025-01-09
Attending: STUDENT IN AN ORGANIZED HEALTH CARE EDUCATION/TRAINING PROGRAM

## 2025-01-09 RX ORDER — METOPROLOL SUCCINATE 50 MG/1
50 TABLET, EXTENDED RELEASE ORAL DAILY
Qty: 90 TABLET | Refills: 3 | Status: SHIPPED | OUTPATIENT
Start: 2025-01-09

## 2025-01-12 ENCOUNTER — HOSPITAL ENCOUNTER (EMERGENCY)
Age: 85
DRG: 193 | End: 2025-01-12
Attending: STUDENT IN AN ORGANIZED HEALTH CARE EDUCATION/TRAINING PROGRAM

## 2025-01-12 ENCOUNTER — APPOINTMENT (OUTPATIENT)
Dept: GENERAL RADIOLOGY | Age: 85
DRG: 193 | End: 2025-01-12
Attending: STUDENT IN AN ORGANIZED HEALTH CARE EDUCATION/TRAINING PROGRAM

## 2025-01-12 ENCOUNTER — NURSE TRIAGE (OUTPATIENT)
Dept: TELEHEALTH | Age: 85
End: 2025-01-12

## 2025-01-12 VITALS
RESPIRATION RATE: 26 BRPM | TEMPERATURE: 101.4 F | DIASTOLIC BLOOD PRESSURE: 60 MMHG | SYSTOLIC BLOOD PRESSURE: 109 MMHG | HEART RATE: 111 BPM | OXYGEN SATURATION: 95 %

## 2025-01-12 DIAGNOSIS — R50.9 ACUTE FEBRILE ILLNESS: Primary | ICD-10-CM

## 2025-01-12 DIAGNOSIS — K21.9 GASTROESOPHAGEAL REFLUX DISEASE WITHOUT ESOPHAGITIS: ICD-10-CM

## 2025-01-12 DIAGNOSIS — E78.5 DYSLIPIDEMIA: ICD-10-CM

## 2025-01-12 DIAGNOSIS — I48.0 PAROXYSMAL ATRIAL FIBRILLATION  (CMD): ICD-10-CM

## 2025-01-12 DIAGNOSIS — J10.1 INFLUENZA A WITH RESPIRATORY MANIFESTATIONS: ICD-10-CM

## 2025-01-12 DIAGNOSIS — Z86.711 HISTORY OF PULMONARY EMBOLISM: ICD-10-CM

## 2025-01-12 DIAGNOSIS — E03.9 ACQUIRED HYPOTHYROIDISM: ICD-10-CM

## 2025-01-12 DIAGNOSIS — I10 ESSENTIAL HYPERTENSION, BENIGN: ICD-10-CM

## 2025-01-12 DIAGNOSIS — E27.40 ADRENAL INSUFFICIENCY  (CMD): ICD-10-CM

## 2025-01-12 DIAGNOSIS — N18.31 STAGE 3A CHRONIC KIDNEY DISEASE  (CMD): ICD-10-CM

## 2025-01-12 DIAGNOSIS — E86.0 DEHYDRATION: ICD-10-CM

## 2025-01-12 DIAGNOSIS — J96.01 ACUTE HYPOXEMIC RESPIRATORY FAILURE  (CMD): ICD-10-CM

## 2025-01-12 LAB
ALBUMIN SERPL-MCNC: 3.7 G/DL (ref 3.4–5)
ALBUMIN/GLOB SERPL: 1.2 {RATIO} (ref 1–2.4)
ALP SERPL-CCNC: 56 UNITS/L (ref 45–117)
ALT SERPL-CCNC: 24 UNITS/L
ANION GAP SERPL CALC-SCNC: 9 MMOL/L (ref 7–19)
APTT PPP: 30 SEC (ref 22–32)
AST SERPL-CCNC: 23 UNITS/L
ATRIAL RATE (BPM): 93
BACTERIA BLD CULT: NORMAL
BACTERIA BLD CULT: NORMAL
BASOPHILS # BLD: 0 K/MCL (ref 0–0.3)
BASOPHILS NFR BLD: 0 %
BILIRUB SERPL-MCNC: 0.5 MG/DL (ref 0.2–1)
BUN SERPL-MCNC: 16 MG/DL (ref 6–20)
BUN/CREAT SERPL: 9 (ref 7–25)
CALCIUM SERPL-MCNC: 8.6 MG/DL (ref 8.4–10.2)
CHLORIDE SERPL-SCNC: 106 MMOL/L (ref 97–110)
CO2 SERPL-SCNC: 26 MMOL/L (ref 21–32)
CREAT SERPL-MCNC: 1.8 MG/DL (ref 0.67–1.17)
DEPRECATED RDW RBC: 45.4 FL (ref 39–50)
EGFRCR SERPLBLD CKD-EPI 2021: 37 ML/MIN/{1.73_M2}
EOSINOPHIL # BLD: 0.1 K/MCL (ref 0–0.5)
EOSINOPHIL NFR BLD: 2 %
ERYTHROCYTE [DISTWIDTH] IN BLOOD: 13.9 % (ref 11–15)
FASTING DURATION TIME PATIENT: ABNORMAL H
FLUAV RNA RESP QL NAA+PROBE: DETECTED
FLUBV RNA RESP QL NAA+PROBE: NOT DETECTED
GLOBULIN SER-MCNC: 3 G/DL (ref 2–4)
GLUCOSE SERPL-MCNC: 107 MG/DL (ref 70–99)
HCT VFR BLD CALC: 40.3 % (ref 39–51)
HGB BLD-MCNC: 13.4 G/DL (ref 13–17)
IMM GRANULOCYTES # BLD AUTO: 0 K/MCL (ref 0–0.2)
IMM GRANULOCYTES # BLD: 0 %
INR PPP: 1.1
LACTATE BLDV-SCNC: 1.5 MMOL/L (ref 0–2)
LYMPHOCYTES # BLD: 1.1 K/MCL (ref 1–4)
LYMPHOCYTES NFR BLD: 16 %
MCH RBC QN AUTO: 29.8 PG (ref 26–34)
MCHC RBC AUTO-ENTMCNC: 33.3 G/DL (ref 32–36.5)
MCV RBC AUTO: 89.6 FL (ref 78–100)
MONOCYTES # BLD: 0.8 K/MCL (ref 0.3–0.9)
MONOCYTES NFR BLD: 11 %
NEUTROPHILS # BLD: 4.9 K/MCL (ref 1.8–7.7)
NEUTROPHILS NFR BLD: 71 %
NRBC BLD MANUAL-RTO: 0 /100 WBC
NT-PROBNP SERPL-MCNC: 1402 PG/ML
P AXIS (DEGREES): 91
PLATELET # BLD AUTO: 161 K/MCL (ref 140–450)
POTASSIUM SERPL-SCNC: 3.5 MMOL/L (ref 3.4–5.1)
PR-INTERVAL (MSEC): 250
PROCALCITONIN SERPL IA-MCNC: 0.26 NG/ML
PROT SERPL-MCNC: 6.7 G/DL (ref 6.4–8.2)
PROTHROMBIN TIME: 12 SEC (ref 9.7–11.8)
QRS-INTERVAL (MSEC): 78
QT-INTERVAL (MSEC): 360
QTC: 448
R AXIS (DEGREES): -7
RBC # BLD: 4.5 MIL/MCL (ref 4.5–5.9)
REPORT TEXT: NORMAL
RSV AG NPH QL IA.RAPID: NOT DETECTED
SARS-COV-2 RNA RESP QL NAA+PROBE: NOT DETECTED
SERVICE CMNT-IMP: ABNORMAL
SERVICE CMNT-IMP: ABNORMAL
SODIUM SERPL-SCNC: 137 MMOL/L (ref 135–145)
T AXIS (DEGREES): 65
TROPONIN I SERPL DL<=0.01 NG/ML-MCNC: 24 NG/L
VENTRICULAR RATE EKG/MIN (BPM): 93
WBC # BLD: 6.9 K/MCL (ref 4.2–11)

## 2025-01-12 PROCEDURE — 85025 COMPLETE CBC W/AUTO DIFF WBC: CPT | Performed by: STUDENT IN AN ORGANIZED HEALTH CARE EDUCATION/TRAINING PROGRAM

## 2025-01-12 PROCEDURE — 10004651 HB RX, NO CHARGE ITEM: Performed by: STUDENT IN AN ORGANIZED HEALTH CARE EDUCATION/TRAINING PROGRAM

## 2025-01-12 PROCEDURE — 99285 EMERGENCY DEPT VISIT HI MDM: CPT

## 2025-01-12 PROCEDURE — 0241U COVID/FLU/RSV PANEL: CPT | Performed by: STUDENT IN AN ORGANIZED HEALTH CARE EDUCATION/TRAINING PROGRAM

## 2025-01-12 PROCEDURE — 71045 X-RAY EXAM CHEST 1 VIEW: CPT

## 2025-01-12 PROCEDURE — 10002801 HB RX 250 W/O HCPCS: Performed by: STUDENT IN AN ORGANIZED HEALTH CARE EDUCATION/TRAINING PROGRAM

## 2025-01-12 PROCEDURE — 83880 ASSAY OF NATRIURETIC PEPTIDE: CPT | Performed by: STUDENT IN AN ORGANIZED HEALTH CARE EDUCATION/TRAINING PROGRAM

## 2025-01-12 PROCEDURE — 85730 THROMBOPLASTIN TIME PARTIAL: CPT | Performed by: STUDENT IN AN ORGANIZED HEALTH CARE EDUCATION/TRAINING PROGRAM

## 2025-01-12 PROCEDURE — 84145 PROCALCITONIN (PCT): CPT | Performed by: STUDENT IN AN ORGANIZED HEALTH CARE EDUCATION/TRAINING PROGRAM

## 2025-01-12 PROCEDURE — 94640 AIRWAY INHALATION TREATMENT: CPT

## 2025-01-12 PROCEDURE — 93005 ELECTROCARDIOGRAM TRACING: CPT | Performed by: STUDENT IN AN ORGANIZED HEALTH CARE EDUCATION/TRAINING PROGRAM

## 2025-01-12 PROCEDURE — 84484 ASSAY OF TROPONIN QUANT: CPT | Performed by: STUDENT IN AN ORGANIZED HEALTH CARE EDUCATION/TRAINING PROGRAM

## 2025-01-12 PROCEDURE — 87040 BLOOD CULTURE FOR BACTERIA: CPT | Performed by: STUDENT IN AN ORGANIZED HEALTH CARE EDUCATION/TRAINING PROGRAM

## 2025-01-12 PROCEDURE — 10002807 HB RX 258: Performed by: STUDENT IN AN ORGANIZED HEALTH CARE EDUCATION/TRAINING PROGRAM

## 2025-01-12 PROCEDURE — 10002803 HB RX 637: Performed by: STUDENT IN AN ORGANIZED HEALTH CARE EDUCATION/TRAINING PROGRAM

## 2025-01-12 PROCEDURE — 96360 HYDRATION IV INFUSION INIT: CPT

## 2025-01-12 PROCEDURE — 10004180 HB COUNTER-TRANSPORT

## 2025-01-12 PROCEDURE — 83605 ASSAY OF LACTIC ACID: CPT | Performed by: STUDENT IN AN ORGANIZED HEALTH CARE EDUCATION/TRAINING PROGRAM

## 2025-01-12 PROCEDURE — 85610 PROTHROMBIN TIME: CPT | Performed by: STUDENT IN AN ORGANIZED HEALTH CARE EDUCATION/TRAINING PROGRAM

## 2025-01-12 PROCEDURE — 80053 COMPREHEN METABOLIC PANEL: CPT | Performed by: STUDENT IN AN ORGANIZED HEALTH CARE EDUCATION/TRAINING PROGRAM

## 2025-01-12 RX ORDER — SODIUM CHLORIDE 9 MG/ML
INJECTION, SOLUTION INTRAVENOUS CONTINUOUS
Status: DISCONTINUED | OUTPATIENT
Start: 2025-01-12 | End: 2025-01-13 | Stop reason: HOSPADM

## 2025-01-12 RX ORDER — OSELTAMIVIR PHOSPHATE 75 MG/1
75 CAPSULE ORAL ONCE
Status: COMPLETED | OUTPATIENT
Start: 2025-01-12 | End: 2025-01-12

## 2025-01-12 RX ORDER — 0.9 % SODIUM CHLORIDE 0.9 %
10 VIAL (ML) INJECTION PRN
Status: DISCONTINUED | OUTPATIENT
Start: 2025-01-12 | End: 2025-01-13 | Stop reason: HOSPADM

## 2025-01-12 RX ORDER — 0.9 % SODIUM CHLORIDE 0.9 %
2 VIAL (ML) INJECTION EVERY 12 HOURS SCHEDULED
Status: DISCONTINUED | OUTPATIENT
Start: 2025-01-12 | End: 2025-01-13 | Stop reason: HOSPADM

## 2025-01-12 RX ORDER — IPRATROPIUM BROMIDE AND ALBUTEROL SULFATE 2.5; .5 MG/3ML; MG/3ML
3 SOLUTION RESPIRATORY (INHALATION) ONCE
Status: COMPLETED | OUTPATIENT
Start: 2025-01-12 | End: 2025-01-12

## 2025-01-12 RX ORDER — ACETAMINOPHEN 500 MG
1000 TABLET ORAL ONCE
Status: COMPLETED | OUTPATIENT
Start: 2025-01-12 | End: 2025-01-12

## 2025-01-12 RX ADMIN — SODIUM CHLORIDE: 9 INJECTION, SOLUTION INTRAVENOUS at 23:07

## 2025-01-12 RX ADMIN — OSELTAMIVIR PHOSPHATE 75 MG: 75 CAPSULE ORAL at 22:26

## 2025-01-12 RX ADMIN — IPRATROPIUM BROMIDE AND ALBUTEROL SULFATE 3 ML: 2.5; .5 SOLUTION RESPIRATORY (INHALATION) at 22:30

## 2025-01-12 RX ADMIN — ACETAMINOPHEN 1000 MG: 500 TABLET ORAL at 21:30

## 2025-01-12 ASSESSMENT — ENCOUNTER SYMPTOMS
NERVOUS/ANXIOUS: 0
DIZZINESS: 0
WEAKNESS: 1
CONFUSION: 0
WOUND: 0
BACK PAIN: 0
SHORTNESS OF BREATH: 1
COUGH: 1
NAUSEA: 0
CHILLS: 1
FATIGUE: 1
NUMBNESS: 0
ABDOMINAL PAIN: 0
VOICE CHANGE: 0
VOMITING: 0
PHOTOPHOBIA: 0
FEVER: 1

## 2025-01-13 PROBLEM — Z86.711 HISTORY OF PULMONARY EMBOLISM: Status: ACTIVE | Noted: 2025-01-13

## 2025-01-13 PROBLEM — R50.9 ACUTE FEBRILE ILLNESS: Status: ACTIVE | Noted: 2025-01-13

## 2025-01-13 PROBLEM — I48.0 PAROXYSMAL ATRIAL FIBRILLATION  (CMD): Status: ACTIVE | Noted: 2025-01-13

## 2025-01-13 PROBLEM — J96.01 ACUTE HYPOXEMIC RESPIRATORY FAILURE  (CMD): Status: ACTIVE | Noted: 2025-01-13

## 2025-01-13 PROBLEM — N18.31 STAGE 3A CHRONIC KIDNEY DISEASE  (CMD): Status: ACTIVE | Noted: 2025-01-13

## 2025-01-13 LAB
ADV 40+41 FIB PROT STL QL NAA+PROBE: NOT DETECTED
ANION GAP SERPL CALC-SCNC: 9 MMOL/L (ref 7–19)
APPEARANCE UR: CLEAR
ASTRO TYP 1-8 RNA STL QL NAA+NON-PROBE: NOT DETECTED
ATRIAL RATE (BPM): 93
BASOPHILS # BLD: 0 K/MCL (ref 0–0.3)
BASOPHILS NFR BLD: 0 %
BILIRUB UR QL STRIP: NEGATIVE
BUN SERPL-MCNC: 21 MG/DL (ref 6–20)
BUN/CREAT SERPL: 9 (ref 7–25)
C CAYETANENSIS DNA STL QL NAA+NON-PROBE: NOT DETECTED
C COLI+JEJ+LAR 16S RRNA STL QL NAA+PROBE: NOT DETECTED
C PARVUM+HOMINIS COWP STL QL NAA+PROBE: NOT DETECTED
CALCIUM SERPL-MCNC: 8.1 MG/DL (ref 8.4–10.2)
CHLORIDE SERPL-SCNC: 108 MMOL/L (ref 97–110)
CO2 SERPL-SCNC: 24 MMOL/L (ref 21–32)
COLOR UR: YELLOW
CREAT SERPL-MCNC: 2.41 MG/DL (ref 0.67–1.17)
DEPRECATED RDW RBC: 48.1 FL (ref 39–50)
E HISTOLYTICA 18S RRNA STL QL NAA+PROBE: NOT DETECTED
EAEC PAA PLAS AGGR+AATA ST NAA+NON-PRB: NOT DETECTED
EC STX1+STX2 GENES STL QL NAA+NON-PROBE: NOT DETECTED
EGFRCR SERPLBLD CKD-EPI 2021: 26 ML/MIN/{1.73_M2}
EOSINOPHIL # BLD: 0.1 K/MCL (ref 0–0.5)
EOSINOPHIL NFR BLD: 1 %
EPEC EAE GENE STL QL NAA+NON-PROBE: NOT DETECTED
ERYTHROCYTE [DISTWIDTH] IN BLOOD: 14.3 % (ref 11–15)
ETEC ELTA+ESTB GENES STL QL NAA+PROBE: NOT DETECTED
FASTING DURATION TIME PATIENT: ABNORMAL H
G LAMBLIA 18S RRNA STL QL NAA+PROBE: NOT DETECTED
GLUCOSE SERPL-MCNC: 100 MG/DL (ref 70–99)
GLUCOSE UR STRIP-MCNC: NEGATIVE MG/DL
HCT VFR BLD CALC: 36.1 % (ref 39–51)
HGB BLD-MCNC: 11.6 G/DL (ref 13–17)
HGB UR QL STRIP: NEGATIVE
IMM GRANULOCYTES # BLD AUTO: 0 K/MCL (ref 0–0.2)
IMM GRANULOCYTES # BLD: 0 %
KETONES UR STRIP-MCNC: NEGATIVE MG/DL
LEUKOCYTE ESTERASE UR QL STRIP: NEGATIVE
LYMPHOCYTES # BLD: 1.3 K/MCL (ref 1–4)
LYMPHOCYTES NFR BLD: 15 %
MAGNESIUM SERPL-MCNC: 1.8 MG/DL (ref 1.7–2.4)
MCH RBC QN AUTO: 29.4 PG (ref 26–34)
MCHC RBC AUTO-ENTMCNC: 32.1 G/DL (ref 32–36.5)
MCV RBC AUTO: 91.6 FL (ref 78–100)
MONOCYTES # BLD: 1.6 K/MCL (ref 0.3–0.9)
MONOCYTES NFR BLD: 19 %
NEUTROPHILS # BLD: 5.6 K/MCL (ref 1.8–7.7)
NEUTROPHILS NFR BLD: 65 %
NITRITE UR QL STRIP: NEGATIVE
NOROVIRUS GI+II RNA STL QL NAA+NON-PROBE: NOT DETECTED
NRBC BLD MANUAL-RTO: 0 /100 WBC
P AXIS (DEGREES): 91
P SHIGELLOIDES DNA STL QL NAA+NON-PROBE: NOT DETECTED
PH UR STRIP: 5 [PH] (ref 5–7)
PLATELET # BLD AUTO: 144 K/MCL (ref 140–450)
POTASSIUM SERPL-SCNC: 4.6 MMOL/L (ref 3.4–5.1)
PR-INTERVAL (MSEC): 250
PROT UR STRIP-MCNC: ABNORMAL MG/DL
QRS-INTERVAL (MSEC): 78
QRS-INTERVAL (MSEC): 80
QT-INTERVAL (MSEC): 360
QT-INTERVAL (MSEC): 372
QTC: 448
QTC: 475
R AXIS (DEGREES): -7
R AXIS (DEGREES): 14
RBC # BLD: 3.94 MIL/MCL (ref 4.5–5.9)
REPORT TEXT: NORMAL
REPORT TEXT: NORMAL
RVA VP6 STL QL NAA+PROBE: NOT DETECTED
SALMONELLA SP INVA+FLIC STL QL NAA+PROBE: NOT DETECTED
SAPO I+II+IV+V RNA STL QL NAA+NON-PROBE: NOT DETECTED
SHIGELLA SP+EIEC IPAH ST NAA+NON-PROBE: NOT DETECTED
SODIUM SERPL-SCNC: 136 MMOL/L (ref 135–145)
SP GR UR STRIP: 1.02 (ref 1–1.03)
T AXIS (DEGREES): 57
T AXIS (DEGREES): 65
UROBILINOGEN UR STRIP-MCNC: 0.2 MG/DL
V CHOL+PARA+VUL DNA STL QL NAA+NON-PROBE: NOT DETECTED
VENTRICULAR RATE EKG/MIN (BPM): 93
VENTRICULAR RATE EKG/MIN (BPM): 98
VIBRIO CHOL TOXIN CTXA STL QL NAA+PROBE: NOT DETECTED
WBC # BLD: 8.6 K/MCL (ref 4.2–11)
WBC STL QL MICRO: POSITIVE
Y ENTEROCOL DNA STL QL NAA+NON-PROBE: NOT DETECTED

## 2025-01-13 PROCEDURE — 87507 IADNA-DNA/RNA PROBE TQ 12-25: CPT | Performed by: INTERNAL MEDICINE

## 2025-01-13 PROCEDURE — 10003562 HB COUNTER - EKG

## 2025-01-13 PROCEDURE — 83630 LACTOFERRIN FECAL (QUAL): CPT | Performed by: INTERNAL MEDICINE

## 2025-01-13 PROCEDURE — 10002019 HB COUNTER RESP ASSESSMENT

## 2025-01-13 PROCEDURE — 87205 SMEAR GRAM STAIN: CPT | Performed by: INTERNAL MEDICINE

## 2025-01-13 PROCEDURE — 81003 URINALYSIS AUTO W/O SCOPE: CPT | Performed by: INTERNAL MEDICINE

## 2025-01-13 PROCEDURE — 97161 PT EVAL LOW COMPLEX 20 MIN: CPT

## 2025-01-13 PROCEDURE — 96361 HYDRATE IV INFUSION ADD-ON: CPT

## 2025-01-13 PROCEDURE — 10004180 HB COUNTER-TRANSPORT

## 2025-01-13 PROCEDURE — 10002807 HB RX 258: Performed by: STUDENT IN AN ORGANIZED HEALTH CARE EDUCATION/TRAINING PROGRAM

## 2025-01-13 PROCEDURE — 93005 ELECTROCARDIOGRAM TRACING: CPT | Performed by: HOSPITALIST

## 2025-01-13 PROCEDURE — G0378 HOSPITAL OBSERVATION PER HR: HCPCS

## 2025-01-13 PROCEDURE — 10006031 HB ROOM CHARGE TELEMETRY

## 2025-01-13 PROCEDURE — 10002803 HB RX 637: Performed by: HOSPITALIST

## 2025-01-13 PROCEDURE — 85025 COMPLETE CBC W/AUTO DIFF WBC: CPT | Performed by: HOSPITALIST

## 2025-01-13 PROCEDURE — 83735 ASSAY OF MAGNESIUM: CPT | Performed by: HOSPITALIST

## 2025-01-13 PROCEDURE — 97530 THERAPEUTIC ACTIVITIES: CPT

## 2025-01-13 PROCEDURE — 80048 BASIC METABOLIC PNL TOTAL CA: CPT | Performed by: HOSPITALIST

## 2025-01-13 PROCEDURE — 10004651 HB RX, NO CHARGE ITEM: Performed by: HOSPITALIST

## 2025-01-13 PROCEDURE — 10002807 HB RX 258: Performed by: INTERNAL MEDICINE

## 2025-01-13 PROCEDURE — 36415 COLL VENOUS BLD VENIPUNCTURE: CPT | Performed by: HOSPITALIST

## 2025-01-13 RX ORDER — ONDANSETRON 4 MG/1
4 TABLET, ORALLY DISINTEGRATING ORAL EVERY 12 HOURS PRN
Status: DISCONTINUED | OUTPATIENT
Start: 2025-01-13 | End: 2025-01-14 | Stop reason: HOSPADM

## 2025-01-13 RX ORDER — ONDANSETRON 2 MG/ML
4 INJECTION INTRAMUSCULAR; INTRAVENOUS EVERY 12 HOURS PRN
Status: DISCONTINUED | OUTPATIENT
Start: 2025-01-13 | End: 2025-01-14 | Stop reason: HOSPADM

## 2025-01-13 RX ORDER — HYDROCORTISONE 10 MG/1
10 TABLET ORAL NIGHTLY
Status: DISCONTINUED | OUTPATIENT
Start: 2025-01-13 | End: 2025-01-14 | Stop reason: HOSPADM

## 2025-01-13 RX ORDER — LEVOTHYROXINE SODIUM 88 UG/1
88 TABLET ORAL DAILY
Status: DISCONTINUED | OUTPATIENT
Start: 2025-01-13 | End: 2025-01-13 | Stop reason: DRUGHIGH

## 2025-01-13 RX ORDER — HYDROCORTISONE 20 MG/1
20 TABLET ORAL
Status: DISCONTINUED | OUTPATIENT
Start: 2025-01-13 | End: 2025-01-14 | Stop reason: HOSPADM

## 2025-01-13 RX ORDER — METOPROLOL SUCCINATE 50 MG/1
50 TABLET, EXTENDED RELEASE ORAL DAILY
Status: DISCONTINUED | OUTPATIENT
Start: 2025-01-13 | End: 2025-01-14 | Stop reason: HOSPADM

## 2025-01-13 RX ORDER — SODIUM CHLORIDE 9 MG/ML
INJECTION, SOLUTION INTRAVENOUS CONTINUOUS
Status: DISCONTINUED | OUTPATIENT
Start: 2025-01-13 | End: 2025-01-13

## 2025-01-13 RX ORDER — ALBUTEROL SULFATE 90 UG/1
2 INHALANT RESPIRATORY (INHALATION)
Status: DISCONTINUED | OUTPATIENT
Start: 2025-01-13 | End: 2025-01-13 | Stop reason: SDUPTHER

## 2025-01-13 RX ORDER — OSELTAMIVIR PHOSPHATE 75 MG/1
75 CAPSULE ORAL EVERY 12 HOURS SCHEDULED
Status: DISCONTINUED | OUTPATIENT
Start: 2025-01-13 | End: 2025-01-13 | Stop reason: DRUGHIGH

## 2025-01-13 RX ORDER — SERTRALINE HYDROCHLORIDE 100 MG/1
100 TABLET, FILM COATED ORAL DAILY
Status: DISCONTINUED | OUTPATIENT
Start: 2025-01-13 | End: 2025-01-14 | Stop reason: HOSPADM

## 2025-01-13 RX ORDER — LEVOTHYROXINE SODIUM 100 UG/1
100 TABLET ORAL
Status: DISCONTINUED | OUTPATIENT
Start: 2025-01-13 | End: 2025-01-14 | Stop reason: HOSPADM

## 2025-01-13 RX ORDER — ATORVASTATIN CALCIUM 20 MG/1
20 TABLET, FILM COATED ORAL NIGHTLY
Status: DISCONTINUED | OUTPATIENT
Start: 2025-01-13 | End: 2025-01-14 | Stop reason: HOSPADM

## 2025-01-13 RX ORDER — ALBUTEROL SULFATE 0.83 MG/ML
2.5 SOLUTION RESPIRATORY (INHALATION)
Status: DISCONTINUED | OUTPATIENT
Start: 2025-01-13 | End: 2025-01-14 | Stop reason: HOSPADM

## 2025-01-13 RX ORDER — OSELTAMIVIR PHOSPHATE 75 MG/1
75 CAPSULE ORAL NIGHTLY
Status: DISCONTINUED | OUTPATIENT
Start: 2025-01-13 | End: 2025-01-14 | Stop reason: HOSPADM

## 2025-01-13 RX ORDER — ALPRAZOLAM 0.25 MG/1
0.25 TABLET ORAL DAILY PRN
Status: DISCONTINUED | OUTPATIENT
Start: 2025-01-13 | End: 2025-01-14 | Stop reason: HOSPADM

## 2025-01-13 RX ORDER — HYDROCORTISONE 10 MG/1
10 TABLET ORAL ONCE
Status: COMPLETED | OUTPATIENT
Start: 2025-01-13 | End: 2025-01-13

## 2025-01-13 RX ORDER — 0.9 % SODIUM CHLORIDE 0.9 %
10 VIAL (ML) INJECTION PRN
Status: DISCONTINUED | OUTPATIENT
Start: 2025-01-13 | End: 2025-01-14 | Stop reason: HOSPADM

## 2025-01-13 RX ORDER — PANTOPRAZOLE SODIUM 40 MG/1
40 TABLET, DELAYED RELEASE ORAL
Status: DISCONTINUED | OUTPATIENT
Start: 2025-01-13 | End: 2025-01-14 | Stop reason: HOSPADM

## 2025-01-13 RX ORDER — POTASSIUM CHLORIDE 1500 MG/1
40 TABLET, EXTENDED RELEASE ORAL ONCE
Status: COMPLETED | OUTPATIENT
Start: 2025-01-13 | End: 2025-01-13

## 2025-01-13 RX ORDER — ACETAMINOPHEN 325 MG/1
650 TABLET ORAL EVERY 4 HOURS PRN
Status: DISCONTINUED | OUTPATIENT
Start: 2025-01-13 | End: 2025-01-14 | Stop reason: HOSPADM

## 2025-01-13 RX ORDER — SODIUM CHLORIDE 9 MG/ML
INJECTION, SOLUTION INTRAVENOUS ONCE
Status: COMPLETED | OUTPATIENT
Start: 2025-01-13 | End: 2025-01-13

## 2025-01-13 RX ORDER — ACETAMINOPHEN 650 MG/1
650 SUPPOSITORY RECTAL EVERY 4 HOURS PRN
Status: DISCONTINUED | OUTPATIENT
Start: 2025-01-13 | End: 2025-01-14 | Stop reason: HOSPADM

## 2025-01-13 RX ORDER — 0.9 % SODIUM CHLORIDE 0.9 %
2 VIAL (ML) INJECTION EVERY 12 HOURS SCHEDULED
Status: DISCONTINUED | OUTPATIENT
Start: 2025-01-13 | End: 2025-01-14 | Stop reason: HOSPADM

## 2025-01-13 RX ADMIN — HYDROCORTISONE 20 MG: 20 TABLET ORAL at 12:48

## 2025-01-13 RX ADMIN — SODIUM CHLORIDE: 9 INJECTION, SOLUTION INTRAVENOUS at 12:50

## 2025-01-13 RX ADMIN — SODIUM CHLORIDE, PRESERVATIVE FREE 2 ML: 5 INJECTION INTRAVENOUS at 08:40

## 2025-01-13 RX ADMIN — SODIUM CHLORIDE, PRESERVATIVE FREE 2 ML: 5 INJECTION INTRAVENOUS at 20:59

## 2025-01-13 RX ADMIN — POTASSIUM CHLORIDE 40 MEQ: 1500 TABLET, EXTENDED RELEASE ORAL at 01:47

## 2025-01-13 RX ADMIN — APIXABAN 5 MG: 5 TABLET, FILM COATED ORAL at 12:48

## 2025-01-13 RX ADMIN — ATORVASTATIN CALCIUM 20 MG: 20 TABLET, FILM COATED ORAL at 20:56

## 2025-01-13 RX ADMIN — SODIUM CHLORIDE 500 ML: 9 INJECTION, SOLUTION INTRAVENOUS at 00:36

## 2025-01-13 RX ADMIN — PANTOPRAZOLE SODIUM 40 MG: 40 TABLET, DELAYED RELEASE ORAL at 06:36

## 2025-01-13 RX ADMIN — ACETAMINOPHEN 650 MG: 325 TABLET ORAL at 15:40

## 2025-01-13 RX ADMIN — ATORVASTATIN CALCIUM 20 MG: 20 TABLET, FILM COATED ORAL at 01:47

## 2025-01-13 RX ADMIN — APIXABAN 5 MG: 5 TABLET, FILM COATED ORAL at 02:16

## 2025-01-13 RX ADMIN — SERTRALINE HYDROCHLORIDE 100 MG: 100 TABLET, FILM COATED ORAL at 08:39

## 2025-01-13 RX ADMIN — LEVOTHYROXINE SODIUM 100 MCG: 0.1 TABLET ORAL at 12:48

## 2025-01-13 RX ADMIN — HYDROCORTISONE 10 MG: 10 TABLET ORAL at 20:57

## 2025-01-13 RX ADMIN — HYDROCORTISONE 10 MG: 10 TABLET ORAL at 02:16

## 2025-01-13 RX ADMIN — ACETAMINOPHEN 650 MG: 325 TABLET ORAL at 01:47

## 2025-01-13 RX ADMIN — APIXABAN 5 MG: 5 TABLET, FILM COATED ORAL at 20:56

## 2025-01-13 RX ADMIN — OSELTAMAVIR PHOSPHATE 75 MG: 75 CAPSULE ORAL at 20:56

## 2025-01-13 SDOH — ECONOMIC STABILITY: HOUSING INSECURITY: WHAT IS YOUR LIVING SITUATION TODAY?: APARTMENT

## 2025-01-13 SDOH — ECONOMIC STABILITY: HOUSING INSECURITY: DO YOU HAVE PROBLEMS WITH ANY OF THE FOLLOWING?: NONE OF THE ABOVE

## 2025-01-13 SDOH — ECONOMIC STABILITY: INCOME INSECURITY: IN THE PAST 12 MONTHS, HAS THE ELECTRIC, GAS, OIL, OR WATER COMPANY THREATENED TO SHUT OFF SERVICE IN YOUR HOME?: NO

## 2025-01-13 SDOH — ECONOMIC STABILITY: HOUSING INSECURITY: WHAT IS YOUR LIVING SITUATION TODAY?: I HAVE A STEADY PLACE TO LIVE

## 2025-01-13 SDOH — SOCIAL STABILITY: SOCIAL INSECURITY: HOW OFTEN DOES ANYONE, INCLUDING FAMILY AND FRIENDS, THREATEN YOU WITH HARM?: NEVER

## 2025-01-13 SDOH — HEALTH STABILITY: PHYSICAL HEALTH: DO YOU HAVE SERIOUS DIFFICULTY WALKING OR CLIMBING STAIRS?: NO

## 2025-01-13 SDOH — ECONOMIC STABILITY: TRANSPORTATION INSECURITY
IN THE PAST 12 MONTHS, HAS LACK OF RELIABLE TRANSPORTATION KEPT YOU FROM MEDICAL APPOINTMENTS, MEETINGS, WORK OR FROM GETTING THINGS NEEDED FOR DAILY LIVING?: NO

## 2025-01-13 SDOH — ECONOMIC STABILITY: FOOD INSECURITY: WITHIN THE PAST 12 MONTHS, THE FOOD YOU BOUGHT JUST DIDN'T LAST AND YOU DIDN'T HAVE MONEY TO GET MORE.: NEVER TRUE

## 2025-01-13 SDOH — SOCIAL STABILITY: SOCIAL INSECURITY: HOW OFTEN DOES ANYONE, INCLUDING FAMILY AND FRIENDS, PHYSICALLY HURT YOU?: NEVER

## 2025-01-13 SDOH — SOCIAL STABILITY: SOCIAL NETWORK
HOW OFTEN DO YOU SEE OR TALK TO PEOPLE THAT YOU CARE ABOUT AND FEEL CLOSE TO? (FOR EXAMPLE: TALKING TO FRIENDS ON THE PHONE, VISITING FRIENDS OR FAMILY, GOING TO CHURCH OR CLUB MEETINGS): 3 TO 5 TIMES A WEEK

## 2025-01-13 SDOH — ECONOMIC STABILITY: GENERAL: WOULD YOU LIKE HELP WITH ANY OF THE FOLLOWING NEEDS?: I DON'T WANT HELP WITH ANY OF THESE

## 2025-01-13 SDOH — SOCIAL STABILITY: SOCIAL INSECURITY: HOW OFTEN DOES ANYONE, INCLUDING FAMILY AND FRIENDS, INSULT OR TALK DOWN TO YOU?: NEVER

## 2025-01-13 SDOH — HEALTH STABILITY: GENERAL
BECAUSE OF A PHYSICAL, MENTAL, OR EMOTIONAL CONDITION, DO YOU HAVE SERIOUS DIFFICULTY CONCENTRATING, REMEMBERING OR MAKING DECISIONS?: NO

## 2025-01-13 SDOH — SOCIAL STABILITY: SOCIAL INSECURITY: HOW OFTEN DOES ANYONE, INCLUDING FAMILY AND FRIENDS, SCREAM OR CURSE AT YOU?: NEVER

## 2025-01-13 SDOH — HEALTH STABILITY: PHYSICAL HEALTH: DO YOU HAVE DIFFICULTY DRESSING OR BATHING?: NO

## 2025-01-13 SDOH — SOCIAL STABILITY: SOCIAL NETWORK: SUPPORT SYSTEMS: SPOUSE

## 2025-01-13 SDOH — HEALTH STABILITY: GENERAL: BECAUSE OF A PHYSICAL, MENTAL, OR EMOTIONAL CONDITION, DO YOU HAVE DIFFICULTY DOING ERRANDS ALONE?: NO

## 2025-01-13 SDOH — ECONOMIC STABILITY: HOUSING INSECURITY: WHAT IS YOUR LIVING SITUATION TODAY?: SPOUSE

## 2025-01-13 SDOH — ECONOMIC STABILITY: GENERAL

## 2025-01-13 ASSESSMENT — PATIENT HEALTH QUESTIONNAIRE - PHQ9
IS PATIENT ABLE TO COMPLETE PHQ2 OR PHQ9: YES
2. FEELING DOWN, DEPRESSED OR HOPELESS: NOT AT ALL
1. LITTLE INTEREST OR PLEASURE IN DOING THINGS: NOT AT ALL
CLINICAL INTERPRETATION OF PHQ2 SCORE: NO FURTHER SCREENING NEEDED
SUM OF ALL RESPONSES TO PHQ9 QUESTIONS 1 AND 2: 0
SUM OF ALL RESPONSES TO PHQ9 QUESTIONS 1 AND 2: 0

## 2025-01-13 ASSESSMENT — ORIENTATION MEMORY CONCENTRATION TEST (OMCT)
OMCT SCORE: 0
WHAT YEAR IS IT NOW (MUST BE EXACT): CORRECT
WHAT TIME IS IT (NO WATCH OR CLOCK): CORRECT
SAY THE MONTHS IN REVERSE ORDER STARTING WITH LAST MONTH: CORRECT
REPEAT THE NAME AND ADDRESS I ASKED YOU TO REMEMBER: CORRECT
COUNT BACKWARDS FROM 20 TO 1: CORRECT
OMCT INTERPRETATION: 0-6: NO SIGNIFICANT IMPAIRMENT
WHAT MONTH IS IT NOW: CORRECT

## 2025-01-13 ASSESSMENT — COGNITIVE AND FUNCTIONAL STATUS - GENERAL
HELP NEEDED FOR TOILETING: A LITTLE
BASIC_MOBILITY_RAW_SCORE: 20
DAILY_ACTIVITY_CONVERTED_SCORE: 44.27
HELP NEEDED FOR BATHING: A LITTLE
DAILY_ACTIVITY_RAW_SCORE: 21
HELP NEEDED DRESSING REGULAR LOWER BODY CLOTHING: A LITTLE
BASIC_MOBILITY_CONVERTED_SCORE: 43.99

## 2025-01-13 ASSESSMENT — PAIN SCALES - GENERAL
PAINLEVEL_OUTOF10: 0

## 2025-01-13 ASSESSMENT — LIFESTYLE VARIABLES
AUDIT-C TOTAL SCORE: 1
ALCOHOL_USE_STATUS: NO OR LOW RISK WITH VALIDATED TOOL
HOW OFTEN DO YOU HAVE A DRINK CONTAINING ALCOHOL: MONTHLY OR LESS
HOW MANY STANDARD DRINKS CONTAINING ALCOHOL DO YOU HAVE ON A TYPICAL DAY: 0,1 OR 2
HOW OFTEN DO YOU HAVE 6 OR MORE DRINKS ON ONE OCCASION: NEVER

## 2025-01-13 ASSESSMENT — PULMONARY FUNCTION TESTS: FEV1/FVC: UNABLE TO OBTAIN, OR GREATER THAN 70%

## 2025-01-13 ASSESSMENT — ACTIVITIES OF DAILY LIVING (ADL)
ADL_SHORT_OF_BREATH: NO
PRIOR_ADL_TOILETING: INDEPENDENT
PRIOR_ADL_BATHING: INDEPENDENT
TOILETING: INDEPENDENT
PRIOR_ADL: INDEPENDENT
BATHING: INDEPENDENT
GROOMING: INDEPENDENT
FEEDING: INDEPENDENT
ADL_SCORE: 24
EATING: INDEPENDENT
RECENT_DECLINE_ADL: NO
ADL_BEFORE_ADMISSION: INDEPENDENT
DRESSING: INDEPENDENT

## 2025-01-13 ASSESSMENT — COLUMBIA-SUICIDE SEVERITY RATING SCALE - C-SSRS
IS THE PATIENT ABLE TO COMPLETE C-SSRS: YES
1. WITHIN THE PAST MONTH, HAVE YOU WISHED YOU WERE DEAD OR WISHED YOU COULD GO TO SLEEP AND NOT WAKE UP?: NO
6. HAVE YOU EVER DONE ANYTHING, STARTED TO DO ANYTHING, OR PREPARED TO DO ANYTHING TO END YOUR LIFE?: NO
2. HAVE YOU ACTUALLY HAD ANY THOUGHTS OF KILLING YOURSELF?: NO

## 2025-01-14 ENCOUNTER — APPOINTMENT (OUTPATIENT)
Dept: PULMONOLOGY | Age: 85
End: 2025-01-14

## 2025-01-14 VITALS
HEIGHT: 74 IN | DIASTOLIC BLOOD PRESSURE: 75 MMHG | HEART RATE: 87 BPM | BODY MASS INDEX: 29.65 KG/M2 | WEIGHT: 231.04 LBS | OXYGEN SATURATION: 95 % | SYSTOLIC BLOOD PRESSURE: 131 MMHG | RESPIRATION RATE: 18 BRPM | TEMPERATURE: 98.2 F

## 2025-01-14 PROBLEM — R19.5 LOOSE BOWEL MOVEMENT: Status: ACTIVE | Noted: 2025-01-14

## 2025-01-14 LAB
ANION GAP SERPL CALC-SCNC: 9 MMOL/L (ref 7–19)
BASOPHILS # BLD: 0 K/MCL (ref 0–0.3)
BASOPHILS NFR BLD: 0 %
BUN SERPL-MCNC: 23 MG/DL (ref 6–20)
BUN/CREAT SERPL: 12 (ref 7–25)
CALCIUM SERPL-MCNC: 8.3 MG/DL (ref 8.4–10.2)
CHLORIDE SERPL-SCNC: 107 MMOL/L (ref 97–110)
CO2 SERPL-SCNC: 23 MMOL/L (ref 21–32)
CREAT SERPL-MCNC: 1.99 MG/DL (ref 0.67–1.17)
DEPRECATED RDW RBC: 46.1 FL (ref 39–50)
EGFRCR SERPLBLD CKD-EPI 2021: 32 ML/MIN/{1.73_M2}
EOSINOPHIL # BLD: 0.1 K/MCL (ref 0–0.5)
EOSINOPHIL NFR BLD: 1 %
ERYTHROCYTE [DISTWIDTH] IN BLOOD: 14.1 % (ref 11–15)
FASTING DURATION TIME PATIENT: ABNORMAL H
GLUCOSE SERPL-MCNC: 97 MG/DL (ref 70–99)
HCT VFR BLD CALC: 35.8 % (ref 39–51)
HGB BLD-MCNC: 11.8 G/DL (ref 13–17)
IMM GRANULOCYTES # BLD AUTO: 0 K/MCL (ref 0–0.2)
IMM GRANULOCYTES # BLD: 0 %
LYMPHOCYTES # BLD: 1.1 K/MCL (ref 1–4)
LYMPHOCYTES NFR BLD: 21 %
MCH RBC QN AUTO: 29.4 PG (ref 26–34)
MCHC RBC AUTO-ENTMCNC: 33 G/DL (ref 32–36.5)
MCV RBC AUTO: 89.1 FL (ref 78–100)
MONOCYTES # BLD: 0.6 K/MCL (ref 0.3–0.9)
MONOCYTES NFR BLD: 12 %
NEUTROPHILS # BLD: 3.5 K/MCL (ref 1.8–7.7)
NEUTROPHILS NFR BLD: 66 %
NRBC BLD MANUAL-RTO: 0 /100 WBC
PLATELET # BLD AUTO: 141 K/MCL (ref 140–450)
POTASSIUM SERPL-SCNC: 4.3 MMOL/L (ref 3.4–5.1)
RBC # BLD: 4.02 MIL/MCL (ref 4.5–5.9)
SODIUM SERPL-SCNC: 135 MMOL/L (ref 135–145)
WBC # BLD: 5.3 K/MCL (ref 4.2–11)

## 2025-01-14 PROCEDURE — 10002803 HB RX 637: Performed by: INTERNAL MEDICINE

## 2025-01-14 PROCEDURE — 85025 COMPLETE CBC W/AUTO DIFF WBC: CPT | Performed by: INTERNAL MEDICINE

## 2025-01-14 PROCEDURE — 10004651 HB RX, NO CHARGE ITEM: Performed by: HOSPITALIST

## 2025-01-14 PROCEDURE — 10002803 HB RX 637: Performed by: HOSPITALIST

## 2025-01-14 PROCEDURE — 36415 COLL VENOUS BLD VENIPUNCTURE: CPT | Performed by: INTERNAL MEDICINE

## 2025-01-14 PROCEDURE — 80048 BASIC METABOLIC PNL TOTAL CA: CPT | Performed by: INTERNAL MEDICINE

## 2025-01-14 RX ORDER — LOPERAMIDE HYDROCHLORIDE 2 MG/1
2 CAPSULE ORAL PRN
Status: DISCONTINUED | OUTPATIENT
Start: 2025-01-14 | End: 2025-01-14 | Stop reason: HOSPADM

## 2025-01-14 RX ORDER — OSELTAMIVIR PHOSPHATE 75 MG/1
75 CAPSULE ORAL NIGHTLY
Qty: 3 CAPSULE | Refills: 0 | Status: SHIPPED | OUTPATIENT
Start: 2025-01-14 | End: 2025-01-17

## 2025-01-14 RX ORDER — LOPERAMIDE HYDROCHLORIDE 2 MG/1
2 CAPSULE ORAL PRN
Qty: 5 CAPSULE | Refills: 0 | Status: SHIPPED | OUTPATIENT
Start: 2025-01-14

## 2025-01-14 RX ADMIN — LOPERAMIDE HYDROCHLORIDE 2 MG: 2 CAPSULE ORAL at 11:23

## 2025-01-14 RX ADMIN — LEVOTHYROXINE SODIUM 100 MCG: 0.1 TABLET ORAL at 06:04

## 2025-01-14 RX ADMIN — SERTRALINE HYDROCHLORIDE 100 MG: 100 TABLET, FILM COATED ORAL at 09:08

## 2025-01-14 RX ADMIN — SODIUM CHLORIDE, PRESERVATIVE FREE 2 ML: 5 INJECTION INTRAVENOUS at 09:08

## 2025-01-14 RX ADMIN — PANTOPRAZOLE SODIUM 40 MG: 40 TABLET, DELAYED RELEASE ORAL at 06:03

## 2025-01-14 RX ADMIN — HYDROCORTISONE 20 MG: 20 TABLET ORAL at 09:08

## 2025-01-14 RX ADMIN — APIXABAN 5 MG: 5 TABLET, FILM COATED ORAL at 09:08

## 2025-01-14 ASSESSMENT — PAIN SCALES - GENERAL: PAINLEVEL_OUTOF10: 0

## 2025-01-15 LAB
BACTERIA SPT AEROBE CULT: NORMAL
GRAM STN SPEC: NORMAL

## 2025-01-16 ENCOUNTER — TELEPHONE (OUTPATIENT)
Dept: CARE COORDINATION | Age: 85
End: 2025-01-16

## 2025-01-17 LAB
BACTERIA BLD CULT: NORMAL
BACTERIA BLD CULT: NORMAL

## 2025-01-20 ENCOUNTER — APPOINTMENT (OUTPATIENT)
Dept: INTERNAL MEDICINE | Age: 85
End: 2025-01-20

## 2025-01-20 VITALS
RESPIRATION RATE: 16 BRPM | WEIGHT: 227.96 LBS | HEIGHT: 72 IN | TEMPERATURE: 98.3 F | DIASTOLIC BLOOD PRESSURE: 76 MMHG | HEART RATE: 66 BPM | OXYGEN SATURATION: 94 % | BODY MASS INDEX: 30.88 KG/M2 | SYSTOLIC BLOOD PRESSURE: 131 MMHG

## 2025-01-20 DIAGNOSIS — R06.09 DOE (DYSPNEA ON EXERTION): Primary | ICD-10-CM

## 2025-01-20 DIAGNOSIS — J44.9 CHRONIC OBSTRUCTIVE PULMONARY DISEASE, UNSPECIFIED COPD TYPE  (CMD): ICD-10-CM

## 2025-01-20 DIAGNOSIS — J10.1 INFLUENZA A: ICD-10-CM

## 2025-01-20 RX ORDER — ALBUTEROL SULFATE 90 UG/1
2 INHALANT RESPIRATORY (INHALATION) EVERY 4 HOURS PRN
Qty: 1 EACH | Refills: 11 | Status: SHIPPED | OUTPATIENT
Start: 2025-01-20

## 2025-01-20 ASSESSMENT — PATIENT HEALTH QUESTIONNAIRE - PHQ9
CLINICAL INTERPRETATION OF PHQ2 SCORE: FURTHER SCREENING NEEDED
4. FEELING TIRED OR HAVING LITTLE ENERGY: NEARLY EVERY DAY
10. IF YOU CHECKED OFF ANY PROBLEMS, HOW DIFFICULT HAVE THESE PROBLEMS MADE IT FOR YOU TO DO YOUR WORK, TAKE CARE OF THINGS AT HOME, OR GET ALONG WITH OTHER PEOPLE: NOT DIFFICULT AT ALL
SUM OF ALL RESPONSES TO PHQ9 QUESTIONS 1 AND 2: 6
9. THOUGHTS THAT YOU WOULD BE BETTER OFF DEAD, OR OF HURTING YOURSELF: SEVERAL DAYS
SUM OF ALL RESPONSES TO PHQ QUESTIONS 1-9: 13
7. TROUBLE CONCENTRATING ON THINGS, SUCH AS READING THE NEWSPAPER OR WATCHING TELEVISION: NOT AT ALL
SUM OF ALL RESPONSES TO PHQ9 QUESTIONS 1 AND 2: 6
6. FEELING BAD ABOUT YOURSELF - OR THAT YOU ARE A FAILURE OR HAVE LET YOURSELF OR YOUR FAMILY DOWN: NOT AT ALL
3. TROUBLE FALLING OR STAYING ASLEEP OR SLEEPING TOO MUCH: NOT AT ALL
1. LITTLE INTEREST OR PLEASURE IN DOING THINGS: NEARLY EVERY DAY
CLINICAL INTERPRETATION OF PHQ9 SCORE: MODERATE DEPRESSION
2. FEELING DOWN, DEPRESSED OR HOPELESS: NEARLY EVERY DAY
5. POOR APPETITE, WEIGHT LOSS, OR OVEREATING: NEARLY EVERY DAY
8. MOVING OR SPEAKING SO SLOWLY THAT OTHER PEOPLE COULD HAVE NOTICED. OR THE OPPOSITE, BEING SO FIGETY OR RESTLESS THAT YOU HAVE BEEN MOVING AROUND A LOT MORE THAN USUAL: NOT AT ALL

## 2025-01-20 ASSESSMENT — PAIN SCALES - GENERAL: PAINLEVEL: 0

## 2025-01-23 ENCOUNTER — TELEPHONE (OUTPATIENT)
Dept: CARE COORDINATION | Age: 85
End: 2025-01-23

## 2025-01-28 ENCOUNTER — TELEPHONE (OUTPATIENT)
Dept: INTERNAL MEDICINE | Age: 85
End: 2025-01-28

## 2025-01-30 ENCOUNTER — TELEPHONE (OUTPATIENT)
Dept: CARE COORDINATION | Age: 85
End: 2025-01-30

## 2025-02-04 ENCOUNTER — APPOINTMENT (OUTPATIENT)
Dept: PULMONOLOGY | Age: 85
End: 2025-02-04
Attending: STUDENT IN AN ORGANIZED HEALTH CARE EDUCATION/TRAINING PROGRAM

## 2025-02-04 VITALS
DIASTOLIC BLOOD PRESSURE: 69 MMHG | BODY MASS INDEX: 30.75 KG/M2 | TEMPERATURE: 97.1 F | HEART RATE: 70 BPM | RESPIRATION RATE: 16 BRPM | WEIGHT: 227 LBS | HEIGHT: 72 IN | OXYGEN SATURATION: 94 % | SYSTOLIC BLOOD PRESSURE: 137 MMHG

## 2025-02-04 DIAGNOSIS — G47.33 OSA (OBSTRUCTIVE SLEEP APNEA): Primary | ICD-10-CM

## 2025-02-04 DIAGNOSIS — R06.00 DYSPNEA, UNSPECIFIED TYPE: ICD-10-CM

## 2025-02-04 PROCEDURE — 99205 OFFICE O/P NEW HI 60 MIN: CPT | Performed by: INTERNAL MEDICINE

## 2025-02-04 ASSESSMENT — PAIN SCALES - GENERAL: PAINLEVEL: 0

## 2025-02-05 ENCOUNTER — E-ADVICE (OUTPATIENT)
Dept: SLEEP MEDICINE | Age: 85
End: 2025-02-05

## 2025-02-07 ENCOUNTER — E-ADVICE (OUTPATIENT)
Dept: SLEEP MEDICINE | Age: 85
End: 2025-02-07

## 2025-02-10 ENCOUNTER — HOSPITAL ENCOUNTER (OUTPATIENT)
Dept: RESPIRATORY THERAPY | Age: 85
Discharge: HOME | End: 2025-02-10
Attending: INTERNAL MEDICINE

## 2025-02-10 DIAGNOSIS — R06.00 DYSPNEA, UNSPECIFIED TYPE: ICD-10-CM

## 2025-02-10 DIAGNOSIS — G47.33 OSA (OBSTRUCTIVE SLEEP APNEA): ICD-10-CM

## 2025-02-10 PROCEDURE — 94060 EVALUATION OF WHEEZING: CPT

## 2025-02-10 PROCEDURE — 94729 DIFFUSING CAPACITY: CPT

## 2025-02-10 PROCEDURE — 94726 PLETHYSMOGRAPHY LUNG VOLUMES: CPT

## 2025-02-10 PROCEDURE — 10002801 HB RX 250 W/O HCPCS

## 2025-02-10 RX ORDER — ALBUTEROL SULFATE 0.83 MG/ML
SOLUTION RESPIRATORY (INHALATION)
Status: COMPLETED
Start: 2025-02-10 | End: 2025-02-10

## 2025-02-10 RX ORDER — ALBUTEROL SULFATE 0.83 MG/ML
2.5 SOLUTION RESPIRATORY (INHALATION) ONCE
Status: COMPLETED | OUTPATIENT
Start: 2025-02-10 | End: 2025-02-10

## 2025-02-10 RX ADMIN — ALBUTEROL SULFATE 2.5 MG: 2.5 SOLUTION RESPIRATORY (INHALATION) at 13:28

## 2025-02-10 RX ADMIN — ALBUTEROL SULFATE 2.5 MG: 0.83 SOLUTION RESPIRATORY (INHALATION) at 13:28

## 2025-02-13 ENCOUNTER — OFFICE VISIT (OUTPATIENT)
Dept: SLEEP MEDICINE | Age: 85
End: 2025-02-13
Attending: INTERNAL MEDICINE

## 2025-02-13 DIAGNOSIS — G47.33 OSA (OBSTRUCTIVE SLEEP APNEA): Primary | ICD-10-CM

## 2025-02-13 DIAGNOSIS — R06.00 DYSPNEA, UNSPECIFIED TYPE: ICD-10-CM

## 2025-02-13 PROCEDURE — 95810 POLYSOM 6/> YRS 4/> PARAM: CPT | Performed by: SPECIALIST

## 2025-02-17 ENCOUNTER — TELEPHONE (OUTPATIENT)
Dept: INTERNAL MEDICINE | Age: 85
End: 2025-02-17

## 2025-02-17 ENCOUNTER — E-ADVICE (OUTPATIENT)
Dept: INTERNAL MEDICINE | Age: 85
End: 2025-02-17

## 2025-02-25 ENCOUNTER — APPOINTMENT (OUTPATIENT)
Dept: PULMONOLOGY | Age: 85
End: 2025-02-25

## 2025-03-04 ENCOUNTER — APPOINTMENT (OUTPATIENT)
Dept: PULMONOLOGY | Age: 85
End: 2025-03-04

## 2025-03-04 VITALS
HEART RATE: 61 BPM | HEIGHT: 72 IN | OXYGEN SATURATION: 98 % | RESPIRATION RATE: 16 BRPM | TEMPERATURE: 97.6 F | DIASTOLIC BLOOD PRESSURE: 85 MMHG | WEIGHT: 226.19 LBS | BODY MASS INDEX: 30.64 KG/M2 | SYSTOLIC BLOOD PRESSURE: 135 MMHG

## 2025-03-04 DIAGNOSIS — R06.00 DYSPNEA, UNSPECIFIED TYPE: Primary | ICD-10-CM

## 2025-03-04 PROCEDURE — 99214 OFFICE O/P EST MOD 30 MIN: CPT | Performed by: INTERNAL MEDICINE

## 2025-03-04 RX ORDER — ALBUTEROL SULFATE 90 UG/1
2 INHALANT RESPIRATORY (INHALATION) EVERY 4 HOURS PRN
Qty: 1 EACH | Refills: 11 | Status: SHIPPED | OUTPATIENT
Start: 2025-03-04

## 2025-03-04 ASSESSMENT — PAIN SCALES - GENERAL: PAINLEVEL: 0

## 2025-03-18 ENCOUNTER — APPOINTMENT (OUTPATIENT)
Dept: INTERNAL MEDICINE | Age: 85
End: 2025-03-18

## 2025-03-18 ENCOUNTER — LAB SERVICES (OUTPATIENT)
Dept: LAB | Age: 85
End: 2025-03-18

## 2025-03-18 VITALS
SYSTOLIC BLOOD PRESSURE: 109 MMHG | BODY MASS INDEX: 30.4 KG/M2 | HEART RATE: 65 BPM | DIASTOLIC BLOOD PRESSURE: 73 MMHG | HEIGHT: 72 IN | WEIGHT: 224.43 LBS | OXYGEN SATURATION: 97 % | RESPIRATION RATE: 16 BRPM | TEMPERATURE: 97.2 F

## 2025-03-18 DIAGNOSIS — F41.1 GENERALIZED ANXIETY DISORDER: ICD-10-CM

## 2025-03-18 DIAGNOSIS — D64.9 ANEMIA, UNSPECIFIED TYPE: ICD-10-CM

## 2025-03-18 DIAGNOSIS — N18.32 STAGE 3B CHRONIC KIDNEY DISEASE  (CMD): ICD-10-CM

## 2025-03-18 DIAGNOSIS — R06.00 DYSPNEA, UNSPECIFIED TYPE: Primary | ICD-10-CM

## 2025-03-18 DIAGNOSIS — D64.9 ANEMIA, UNSPECIFIED TYPE: Primary | ICD-10-CM

## 2025-03-18 LAB
BASOPHILS # BLD: 0 K/MCL (ref 0–0.3)
BASOPHILS NFR BLD: 0 %
CREAT UR-MCNC: 258 MG/DL
DEPRECATED RDW RBC: 50.4 FL (ref 39–50)
EOSINOPHIL # BLD: 0.2 K/MCL (ref 0–0.5)
EOSINOPHIL NFR BLD: 1 %
ERYTHROCYTE [DISTWIDTH] IN BLOOD: 15.2 % (ref 11–15)
FERRITIN SERPL-MCNC: 33 NG/ML (ref 26–388)
HCT VFR BLD CALC: 42.4 % (ref 39–51)
HGB BLD-MCNC: 13.7 G/DL (ref 13–17)
IMM GRANULOCYTES # BLD AUTO: 0 K/MCL (ref 0–0.2)
IMM GRANULOCYTES # BLD: 0 %
IRON SATN MFR SERPL: 24 % (ref 15–45)
IRON SERPL-MCNC: 85 MCG/DL (ref 65–175)
LYMPHOCYTES # BLD: 2.7 K/MCL (ref 1–4)
LYMPHOCYTES NFR BLD: 24 %
MCH RBC QN AUTO: 29.5 PG (ref 26–34)
MCHC RBC AUTO-ENTMCNC: 32.3 G/DL (ref 32–36.5)
MCV RBC AUTO: 91.4 FL (ref 78–100)
MICROALBUMIN UR-MCNC: 8.07 MG/DL
MICROALBUMIN/CREAT UR: 31.3 MG/G
MONOCYTES # BLD: 1 K/MCL (ref 0.3–0.9)
MONOCYTES NFR BLD: 9 %
NEUTROPHILS # BLD: 7.5 K/MCL (ref 1.8–7.7)
NEUTROPHILS NFR BLD: 66 %
NRBC BLD MANUAL-RTO: 0 /100 WBC
PLATELET # BLD AUTO: 240 K/MCL (ref 140–450)
RBC # BLD: 4.64 MIL/MCL (ref 4.5–5.9)
TIBC SERPL-MCNC: 347 MCG/DL (ref 250–450)
WBC # BLD: 11.4 K/MCL (ref 4.2–11)

## 2025-03-18 PROCEDURE — 36415 COLL VENOUS BLD VENIPUNCTURE: CPT | Performed by: CLINICAL MEDICAL LABORATORY

## 2025-03-18 PROCEDURE — 82728 ASSAY OF FERRITIN: CPT | Performed by: CLINICAL MEDICAL LABORATORY

## 2025-03-18 PROCEDURE — 83550 IRON BINDING TEST: CPT | Performed by: CLINICAL MEDICAL LABORATORY

## 2025-03-18 PROCEDURE — 99214 OFFICE O/P EST MOD 30 MIN: CPT | Performed by: STUDENT IN AN ORGANIZED HEALTH CARE EDUCATION/TRAINING PROGRAM

## 2025-03-18 PROCEDURE — 85025 COMPLETE CBC W/AUTO DIFF WBC: CPT | Performed by: CLINICAL MEDICAL LABORATORY

## 2025-03-18 PROCEDURE — 83540 ASSAY OF IRON: CPT | Performed by: CLINICAL MEDICAL LABORATORY

## 2025-03-18 RX ORDER — BUPROPION HYDROCHLORIDE 150 MG/1
150 TABLET ORAL DAILY
Qty: 90 TABLET | Refills: 1 | Status: SHIPPED | OUTPATIENT
Start: 2025-03-18

## 2025-03-18 ASSESSMENT — PATIENT HEALTH QUESTIONNAIRE - PHQ9
SUM OF ALL RESPONSES TO PHQ9 QUESTIONS 1 AND 2: 0
2. FEELING DOWN, DEPRESSED OR HOPELESS: NOT AT ALL
CLINICAL INTERPRETATION OF PHQ2 SCORE: NO FURTHER SCREENING NEEDED
SUM OF ALL RESPONSES TO PHQ9 QUESTIONS 1 AND 2: 0
1. LITTLE INTEREST OR PLEASURE IN DOING THINGS: NOT AT ALL

## 2025-03-18 ASSESSMENT — PAIN SCALES - GENERAL: PAINLEVEL: 0

## 2025-03-24 ENCOUNTER — TELEPHONE (OUTPATIENT)
Dept: INTERNAL MEDICINE | Age: 85
End: 2025-03-24

## 2025-07-07 ENCOUNTER — TELEPHONE (OUTPATIENT)
Dept: PULMONOLOGY | Facility: CLINIC | Age: 85
End: 2025-07-07

## 2025-07-08 NOTE — TELEPHONE ENCOUNTER
Noted. Would recommend he see pulmonary where he is residing now. I believe he may have been seeing pulmonary previously for abnormal CT chest.

## 2025-07-09 NOTE — TELEPHONE ENCOUNTER
Spoke with patient and informed of Casey MONTES's message below. Patient states he will make an appointment with pulmonary provider where he resides.

## 2025-07-11 ENCOUNTER — TELEPHONE (OUTPATIENT)
Dept: PULMONOLOGY | Facility: CLINIC | Age: 85
End: 2025-07-11

## 2025-07-24 RX ORDER — SERTRALINE HYDROCHLORIDE 100 MG/1
100 TABLET, FILM COATED ORAL DAILY
Qty: 90 TABLET | Refills: 1 | Status: SHIPPED | OUTPATIENT
Start: 2025-07-24

## (undated) NOTE — ED AVS SNAPSHOT
[facility] in [address] [address] [place]    Phone:  [phone]    Fax:  [phone]           Joy Hernandez   MRN: [id number]    Department:  [facility] in [address] [place] [address]   Date of Visit: aspect of your visit today. In an effort to constantly improve our service to you, we would appreciate any positive or negative feedback related to the care you received in our Immediate Care. Please call our [address] at [phone].   Your Immediate contact you. Please make sure we have your correct phone number on file.      OUR CURRENT HOURS OF OPERATION:  [facility]  WEEKENDS AND HOLIDAYS 8AM - 6PM    I certified that I have received a copy of the aftercare instructions; that t information, go to [email or web address] and click on the Sign Up Now link in the [employer] box. Enter your [employer] Activation Code exactly as it appears below along with your Zip Code and Date of Birth to complete the sign-up process.  If you do